# Patient Record
Sex: MALE | Race: WHITE | Employment: OTHER | URBAN - METROPOLITAN AREA
[De-identification: names, ages, dates, MRNs, and addresses within clinical notes are randomized per-mention and may not be internally consistent; named-entity substitution may affect disease eponyms.]

---

## 2023-05-09 ENCOUNTER — HOSPITAL ENCOUNTER (OUTPATIENT)
Dept: VASCULAR LAB | Age: 63
Discharge: HOME | End: 2023-05-09
Payer: MEDICARE

## 2023-05-09 PROCEDURE — 93971 EXTREMITY STUDY: CPT

## 2024-12-12 ENCOUNTER — APPOINTMENT (OUTPATIENT)
Dept: GENERAL RADIOLOGY | Age: 64
DRG: 915 | End: 2024-12-12
Payer: MEDICARE

## 2024-12-12 ENCOUNTER — HOSPITAL ENCOUNTER (EMERGENCY)
Age: 64
Discharge: HOME | DRG: 915 | End: 2024-12-12
Attending: EMERGENCY MEDICINE
Payer: MEDICARE

## 2024-12-12 ENCOUNTER — APPOINTMENT (OUTPATIENT)
Dept: CT IMAGING | Age: 64
DRG: 915 | End: 2024-12-12
Payer: MEDICARE

## 2024-12-12 ENCOUNTER — APPOINTMENT (OUTPATIENT)
Age: 64
DRG: 915 | End: 2024-12-12
Attending: INTERNAL MEDICINE
Payer: MEDICARE

## 2024-12-12 ENCOUNTER — HOSPITAL ENCOUNTER (INPATIENT)
Age: 64
LOS: 3 days | Discharge: HOSPICE | DRG: 915 | End: 2024-12-15
Attending: EMERGENCY MEDICINE | Admitting: STUDENT IN AN ORGANIZED HEALTH CARE EDUCATION/TRAINING PROGRAM
Payer: MEDICARE

## 2024-12-12 VITALS
HEART RATE: 108 BPM | WEIGHT: 243 LBS | TEMPERATURE: 97.4 F | SYSTOLIC BLOOD PRESSURE: 135 MMHG | DIASTOLIC BLOOD PRESSURE: 84 MMHG | RESPIRATION RATE: 25 BRPM | OXYGEN SATURATION: 100 %

## 2024-12-12 DIAGNOSIS — I46.9 CARDIAC ARREST: Primary | ICD-10-CM

## 2024-12-12 DIAGNOSIS — T78.2XXA ANAPHYLAXIS, INITIAL ENCOUNTER: ICD-10-CM

## 2024-12-12 LAB
ACANTHOCYTES BLD QL SMEAR: ABNORMAL
ALBUMIN SERPL-MCNC: 3 G/DL (ref 3.4–5)
ALBUMIN SERPL-MCNC: 3.3 G/DL (ref 3.4–5)
ALBUMIN/GLOB SERPL: 1.3 {RATIO} (ref 1.1–2.2)
ALP SERPL-CCNC: 100 U/L (ref 40–129)
ALT SERPL-CCNC: 241 U/L (ref 10–40)
ANION GAP SERPL CALCULATED.3IONS-SCNC: 15 MMOL/L (ref 3–16)
ANION GAP SERPL CALCULATED.3IONS-SCNC: 17 MMOL/L (ref 3–16)
ANISOCYTOSIS BLD QL SMEAR: ABNORMAL
ANTI-XA UNFRAC HEPARIN: >1.1 IU/ML (ref 0.3–0.7)
ANTI-XA UNFRAC HEPARIN: >1.1 IU/ML (ref 0.3–0.7)
APTT BLD: 30 SEC (ref 22.1–36.4)
APTT BLD: 33.2 SEC (ref 22.1–36.4)
AST SERPL-CCNC: 169 U/L (ref 15–37)
BASE EXCESS BLDA CALC-SCNC: -4 MMOL/L (ref -3–3)
BASE EXCESS BLDA CALC-SCNC: -7 MMOL/L (ref -3–3)
BASE EXCESS BLDV CALC-SCNC: -18.7 MMOL/L (ref -3–3)
BASE EXCESS BLDV CALC-SCNC: -9.8 MMOL/L (ref -3–3)
BASOPHILS # BLD: 0 K/UL (ref 0–0.2)
BASOPHILS NFR BLD: 0 %
BILIRUB SERPL-MCNC: <0.2 MG/DL (ref 0–1)
BUN SERPL-MCNC: 24 MG/DL (ref 7–20)
BUN SERPL-MCNC: 24 MG/DL (ref 7–20)
CALCIUM SERPL-MCNC: 8.6 MG/DL (ref 8.3–10.6)
CALCIUM SERPL-MCNC: 8.7 MG/DL (ref 8.3–10.6)
CHLORIDE SERPL-SCNC: 105 MMOL/L (ref 99–110)
CHLORIDE SERPL-SCNC: 106 MMOL/L (ref 99–110)
CO2 BLDA-SCNC: 21 MMOL/L
CO2 BLDA-SCNC: 24 MMOL/L
CO2 BLDV-SCNC: 20 MMOL/L
CO2 BLDV-SCNC: 21 MMOL/L
CO2 SERPL-SCNC: 18 MMOL/L (ref 21–32)
CO2 SERPL-SCNC: 20 MMOL/L (ref 21–32)
COHGB MFR BLDV: 2.3 % (ref 0–1.5)
COHGB MFR BLDV: 3.5 % (ref 0–1.5)
CREAT SERPL-MCNC: 1.3 MG/DL (ref 0.8–1.3)
CREAT SERPL-MCNC: 1.4 MG/DL (ref 0.8–1.3)
DACRYOCYTES BLD QL SMEAR: ABNORMAL
DEPRECATED RDW RBC AUTO: 21.2 % (ref 12.4–15.4)
ECHO AO ASC DIAM: 4 CM
ECHO AO ASCENDING AORTA INDEX: 1.85 CM/M2
ECHO AO ROOT DIAM: 3.6 CM
ECHO AO ROOT INDEX: 1.67 CM/M2
ECHO AV AREA PEAK VELOCITY: 4.1 CM2
ECHO AV AREA/BSA PEAK VELOCITY: 1.9 CM2/M2
ECHO AV CUSP MM: 1.7 CM
ECHO AV PEAK GRADIENT: 7 MMHG
ECHO AV PEAK GRADIENT: 7 MMHG
ECHO AV PEAK VELOCITY: 1.3 M/S
ECHO AV VELOCITY RATIO: 0.92
ECHO BSA: 2.24 M2
ECHO EST RA PRESSURE: 8 MMHG
ECHO IVC PROX: 1.5 CM
ECHO LA AREA 2C: 18.7 CM2
ECHO LA AREA 4C: 15 CM2
ECHO LA DIAMETER INDEX: 1.2 CM/M2
ECHO LA DIAMETER: 2.6 CM
ECHO LA MAJOR AXIS: 5.8 CM
ECHO LA MINOR AXIS: 5.1 CM
ECHO LA TO AORTIC ROOT RATIO: 0.72
ECHO LA VOL BP: 42 ML (ref 18–58)
ECHO LA VOL MOD A2C: 53 ML (ref 18–58)
ECHO LA VOL MOD A4C: 31 ML (ref 18–58)
ECHO LA VOL/BSA BIPLANE: 19 ML/M2 (ref 16–34)
ECHO LA VOLUME INDEX MOD A2C: 25 ML/M2 (ref 16–34)
ECHO LA VOLUME INDEX MOD A4C: 14 ML/M2 (ref 16–34)
ECHO LV E' LATERAL VELOCITY: 5.33 CM/S
ECHO LV E' SEPTAL VELOCITY: 6.96 CM/S
ECHO LV EDV A2C: 83 ML
ECHO LV EDV A4C: 89 ML
ECHO LV EDV INDEX A4C: 41 ML/M2
ECHO LV EDV NDEX A2C: 38 ML/M2
ECHO LV EF PHYSICIAN: 53 %
ECHO LV EJECTION FRACTION A2C: 54 %
ECHO LV EJECTION FRACTION A4C: 60 %
ECHO LV ESV A2C: 38 ML
ECHO LV ESV A4C: 36 ML
ECHO LV ESV INDEX A2C: 18 ML/M2
ECHO LV ESV INDEX A4C: 17 ML/M2
ECHO LV FRACTIONAL SHORTENING: 34 % (ref 28–44)
ECHO LV INTERNAL DIMENSION DIASTOLE INDEX: 2.04 CM/M2
ECHO LV INTERNAL DIMENSION DIASTOLIC: 4.4 CM (ref 4.2–5.9)
ECHO LV INTERNAL DIMENSION SYSTOLIC INDEX: 1.34 CM/M2
ECHO LV INTERNAL DIMENSION SYSTOLIC: 2.9 CM
ECHO LV ISOVOLUMETRIC RELAXATION TIME (IVRT): 132 MS
ECHO LV IVSD: 1.2 CM (ref 0.6–1)
ECHO LV MASS 2D: 168.9 G (ref 88–224)
ECHO LV MASS INDEX 2D: 78.2 G/M2 (ref 49–115)
ECHO LV POSTERIOR WALL DIASTOLIC: 1 CM (ref 0.6–1)
ECHO LV RELATIVE WALL THICKNESS RATIO: 0.45
ECHO LVOT AREA: 4.5 CM2
ECHO LVOT DIAM: 2.4 CM
ECHO LVOT MEAN GRADIENT: 3 MMHG
ECHO LVOT PEAK GRADIENT: 6 MMHG
ECHO LVOT PEAK VELOCITY: 1.2 M/S
ECHO LVOT STROKE VOLUME INDEX: 45.2 ML/M2
ECHO LVOT SV: 97.7 ML
ECHO LVOT VTI: 21.6 CM
ECHO MV A VELOCITY: 1.01 M/S
ECHO MV E VELOCITY: 0.69 M/S
ECHO MV E/A RATIO: 0.68
ECHO MV E/E' LATERAL: 12.95
ECHO MV E/E' RATIO (AVERAGED): 11.43
ECHO MV E/E' SEPTAL: 9.91
ECHO RA AREA 4C: 18.6 CM2
ECHO RA END SYSTOLIC VOLUME APICAL 4 CHAMBER INDEX BSA: 24 ML/M2
ECHO RA VOLUME: 51 ML
ECHO RV BASAL DIMENSION: 5 CM
ECHO RV FRACTIONAL AREA CHANGE: 32 %
ECHO RV FREE WALL PEAK S': 13.3 CM/S
ECHO RV LONGITUDINAL DIMENSION: 6.3 CM
ECHO RV MID DIMENSION: 4.2 CM
ECHO RV TAPSE: 2 CM (ref 1.7–?)
EKG ATRIAL RATE: 144 BPM
EKG DIAGNOSIS: NORMAL
EKG P AXIS: 102 DEGREES
EKG P-R INTERVAL: 158 MS
EKG Q-T INTERVAL: 356 MS
EKG QRS DURATION: 146 MS
EKG QTC CALCULATION (BAZETT): 537 MS
EKG R AXIS: 84 DEGREES
EKG T AXIS: -1 DEGREES
EKG VENTRICULAR RATE: 137 BPM
EOSINOPHIL # BLD: 0.1 K/UL (ref 0–0.6)
EOSINOPHIL NFR BLD: 1 %
GFR SERPLBLD CREATININE-BSD FMLA CKD-EPI: 55 ML/MIN/{1.73_M2}
GFR SERPLBLD CREATININE-BSD FMLA CKD-EPI: 61 ML/MIN/{1.73_M2}
GLUCOSE BLD-MCNC: 165 MG/DL (ref 70–99)
GLUCOSE BLD-MCNC: 218 MG/DL (ref 70–99)
GLUCOSE SERPL-MCNC: 153 MG/DL (ref 70–99)
GLUCOSE SERPL-MCNC: 284 MG/DL (ref 70–99)
HCO3 BLDA-SCNC: 19.7 MMOL/L (ref 21–29)
HCO3 BLDA-SCNC: 22.3 MMOL/L (ref 21–29)
HCO3 BLDV-SCNC: 17.2 MMOL/L (ref 23–29)
HCO3 BLDV-SCNC: 18.6 MMOL/L (ref 23–29)
HCT VFR BLD AUTO: 35.7 % (ref 40.5–52.5)
HGB BLD-MCNC: 10.9 G/DL (ref 13.5–17.5)
INR PPP: 1.3 (ref 0.85–1.15)
LACTATE BLD-SCNC: 2.48 MMOL/L (ref 0.4–2)
LACTATE BLD-SCNC: 3.92 MMOL/L (ref 0.4–2)
LYMPHOCYTES # BLD: 2 K/UL (ref 1–5.1)
LYMPHOCYTES NFR BLD: 32 %
MACROCYTES BLD QL SMEAR: ABNORMAL
MAGNESIUM SERPL-MCNC: 1.85 MG/DL (ref 1.8–2.4)
MCH RBC QN AUTO: 33.4 PG (ref 26–34)
MCHC RBC AUTO-ENTMCNC: 30.5 G/DL (ref 31–36)
MCV RBC AUTO: 109.6 FL (ref 80–100)
METAMYELOCYTES NFR BLD MANUAL: 3 %
METHGB MFR BLDV: 0.1 %
METHGB MFR BLDV: 0.3 %
MICROCYTES BLD QL SMEAR: ABNORMAL
MONOCYTES # BLD: 1 K/UL (ref 0–1.3)
MONOCYTES NFR BLD: 16 %
NEUTROPHILS # BLD: 3.1 K/UL (ref 1.7–7.7)
NEUTROPHILS NFR BLD: 46 %
NEUTS BAND NFR BLD MANUAL: 2 % (ref 0–7)
NRBC BLD-RTO: 9 /100 WBC
O2 THERAPY: ABNORMAL
O2 THERAPY: ABNORMAL
OVALOCYTES BLD QL SMEAR: ABNORMAL
PATH INTERP BLD-IMP: YES
PCO2 BLDA: 38.7 MM HG (ref 35–45)
PCO2 BLDA: 42 MM HG (ref 35–45)
PCO2 BLDV: 113.5 MMHG (ref 40–50)
PCO2 BLDV: 51.8 MMHG (ref 40–50)
PERFORMED ON: ABNORMAL
PERFORMED ON: ABNORMAL
PH BLDA: 7.31 [PH] (ref 7.35–7.45)
PH BLDA: 7.33 [PH] (ref 7.35–7.45)
PH BLDV: 6.8 [PH] (ref 7.35–7.45)
PH BLDV: 7.17 [PH] (ref 7.35–7.45)
PHOSPHATE SERPL-MCNC: 3.5 MG/DL (ref 2.5–4.9)
PLATELET # BLD AUTO: 215 K/UL (ref 135–450)
PLATELET BLD QL SMEAR: ADEQUATE
PMV BLD AUTO: 10.1 FL (ref 5–10.5)
PO2 BLDA: 105.1 MM HG (ref 75–108)
PO2 BLDA: 123.5 MM HG (ref 75–108)
PO2 BLDV: 106.3 MMHG (ref 25–40)
PO2 BLDV: 58.9 MMHG (ref 25–40)
POC SAMPLE TYPE: ABNORMAL
POC SAMPLE TYPE: ABNORMAL
POIKILOCYTOSIS BLD QL SMEAR: ABNORMAL
POTASSIUM SERPL-SCNC: 4.2 MMOL/L (ref 3.5–5.1)
POTASSIUM SERPL-SCNC: 4.4 MMOL/L (ref 3.5–5.1)
PROT SERPL-MCNC: 5.4 G/DL (ref 6.4–8.2)
PROTHROMBIN TIME: 16.4 SEC (ref 11.9–14.9)
RBC # BLD AUTO: 3.26 M/UL (ref 4.2–5.9)
REASON FOR REJECTION: NORMAL
REJECTED TEST: NORMAL
SAO2 % BLDA: 98 % (ref 93–100)
SAO2 % BLDA: 99 % (ref 93–100)
SAO2 % BLDV: 62 %
SAO2 % BLDV: 96 %
SCHISTOCYTES BLD QL SMEAR: ABNORMAL
SLIDE REVIEW: ABNORMAL
SODIUM SERPL-SCNC: 140 MMOL/L (ref 136–145)
SODIUM SERPL-SCNC: 141 MMOL/L (ref 136–145)
TROPONIN, HIGH SENSITIVITY: 122 NG/L (ref 0–22)
WBC # BLD AUTO: 6.1 K/UL (ref 4–11)

## 2024-12-12 PROCEDURE — 85730 THROMBOPLASTIN TIME PARTIAL: CPT

## 2024-12-12 PROCEDURE — 84484 ASSAY OF TROPONIN QUANT: CPT

## 2024-12-12 PROCEDURE — 36415 COLL VENOUS BLD VENIPUNCTURE: CPT

## 2024-12-12 PROCEDURE — 6360000002 HC RX W HCPCS: Performed by: EMERGENCY MEDICINE

## 2024-12-12 PROCEDURE — 99285 EMERGENCY DEPT VISIT HI MDM: CPT

## 2024-12-12 PROCEDURE — 83605 ASSAY OF LACTIC ACID: CPT

## 2024-12-12 PROCEDURE — 99291 CRITICAL CARE FIRST HOUR: CPT | Performed by: STUDENT IN AN ORGANIZED HEALTH CARE EDUCATION/TRAINING PROGRAM

## 2024-12-12 PROCEDURE — 5A12012 PERFORMANCE OF CARDIAC OUTPUT, SINGLE, MANUAL: ICD-10-PCS | Performed by: EMERGENCY MEDICINE

## 2024-12-12 PROCEDURE — 71260 CT THORAX DX C+: CPT

## 2024-12-12 PROCEDURE — 85610 PROTHROMBIN TIME: CPT

## 2024-12-12 PROCEDURE — 96374 THER/PROPH/DIAG INJ IV PUSH: CPT

## 2024-12-12 PROCEDURE — 2500000003 HC RX 250 WO HCPCS: Performed by: EMERGENCY MEDICINE

## 2024-12-12 PROCEDURE — 94761 N-INVAS EAR/PLS OXIMETRY MLT: CPT

## 2024-12-12 PROCEDURE — C8929 TTE W OR WO FOL WCON,DOPPLER: HCPCS

## 2024-12-12 PROCEDURE — 71045 X-RAY EXAM CHEST 1 VIEW: CPT

## 2024-12-12 PROCEDURE — 80053 COMPREHEN METABOLIC PANEL: CPT

## 2024-12-12 PROCEDURE — 2580000003 HC RX 258: Performed by: STUDENT IN AN ORGANIZED HEALTH CARE EDUCATION/TRAINING PROGRAM

## 2024-12-12 PROCEDURE — 2700000000 HC OXYGEN THERAPY PER DAY

## 2024-12-12 PROCEDURE — 85520 HEPARIN ASSAY: CPT

## 2024-12-12 PROCEDURE — 99291 CRITICAL CARE FIRST HOUR: CPT

## 2024-12-12 PROCEDURE — 6370000000 HC RX 637 (ALT 250 FOR IP): Performed by: STUDENT IN AN ORGANIZED HEALTH CARE EDUCATION/TRAINING PROGRAM

## 2024-12-12 PROCEDURE — 6360000004 HC RX CONTRAST MEDICATION: Performed by: EMERGENCY MEDICINE

## 2024-12-12 PROCEDURE — 94003 VENT MGMT INPAT SUBQ DAY: CPT

## 2024-12-12 PROCEDURE — 82947 ASSAY GLUCOSE BLOOD QUANT: CPT

## 2024-12-12 PROCEDURE — 74018 RADEX ABDOMEN 1 VIEW: CPT

## 2024-12-12 PROCEDURE — 5A1945Z RESPIRATORY VENTILATION, 24-96 CONSECUTIVE HOURS: ICD-10-PCS | Performed by: STUDENT IN AN ORGANIZED HEALTH CARE EDUCATION/TRAINING PROGRAM

## 2024-12-12 PROCEDURE — 31500 INSERT EMERGENCY AIRWAY: CPT

## 2024-12-12 PROCEDURE — 70450 CT HEAD/BRAIN W/O DYE: CPT

## 2024-12-12 PROCEDURE — 93005 ELECTROCARDIOGRAM TRACING: CPT | Performed by: INTERNAL MEDICINE

## 2024-12-12 PROCEDURE — 2000000000 HC ICU R&B

## 2024-12-12 PROCEDURE — 93010 ELECTROCARDIOGRAM REPORT: CPT | Performed by: INTERNAL MEDICINE

## 2024-12-12 PROCEDURE — 93005 ELECTROCARDIOGRAM TRACING: CPT | Performed by: EMERGENCY MEDICINE

## 2024-12-12 PROCEDURE — 6360000002 HC RX W HCPCS: Performed by: STUDENT IN AN ORGANIZED HEALTH CARE EDUCATION/TRAINING PROGRAM

## 2024-12-12 PROCEDURE — 0BH17EZ INSERTION OF ENDOTRACHEAL AIRWAY INTO TRACHEA, VIA NATURAL OR ARTIFICIAL OPENING: ICD-10-PCS | Performed by: EMERGENCY MEDICINE

## 2024-12-12 PROCEDURE — 85025 COMPLETE CBC W/AUTO DIFF WBC: CPT

## 2024-12-12 PROCEDURE — 99284 EMERGENCY DEPT VISIT MOD MDM: CPT

## 2024-12-12 PROCEDURE — 96365 THER/PROPH/DIAG IV INF INIT: CPT

## 2024-12-12 PROCEDURE — 99291 CRITICAL CARE FIRST HOUR: CPT | Performed by: INTERNAL MEDICINE

## 2024-12-12 PROCEDURE — 82803 BLOOD GASES ANY COMBINATION: CPT

## 2024-12-12 PROCEDURE — 6360000004 HC RX CONTRAST MEDICATION: Performed by: INTERNAL MEDICINE

## 2024-12-12 PROCEDURE — 83735 ASSAY OF MAGNESIUM: CPT

## 2024-12-12 PROCEDURE — 87040 BLOOD CULTURE FOR BACTERIA: CPT

## 2024-12-12 PROCEDURE — 96375 TX/PRO/DX INJ NEW DRUG ADDON: CPT

## 2024-12-12 PROCEDURE — 93306 TTE W/DOPPLER COMPLETE: CPT | Performed by: INTERNAL MEDICINE

## 2024-12-12 RX ORDER — HEPARIN SODIUM 10000 [USP'U]/100ML
5-30 INJECTION, SOLUTION INTRAVENOUS CONTINUOUS
Status: DISCONTINUED | OUTPATIENT
Start: 2024-12-12 | End: 2024-12-13

## 2024-12-12 RX ORDER — HEPARIN SODIUM 1000 [USP'U]/ML
40 INJECTION, SOLUTION INTRAVENOUS; SUBCUTANEOUS PRN
Status: DISCONTINUED | OUTPATIENT
Start: 2024-12-12 | End: 2024-12-12 | Stop reason: DRUGHIGH

## 2024-12-12 RX ORDER — ONDANSETRON 2 MG/ML
4 INJECTION INTRAMUSCULAR; INTRAVENOUS EVERY 6 HOURS PRN
Status: DISCONTINUED | OUTPATIENT
Start: 2024-12-12 | End: 2024-12-13

## 2024-12-12 RX ORDER — ENOXAPARIN SODIUM 100 MG/ML
30 INJECTION SUBCUTANEOUS 2 TIMES DAILY
Status: DISCONTINUED | OUTPATIENT
Start: 2024-12-12 | End: 2024-12-12

## 2024-12-12 RX ORDER — CALCIUM CHLORIDE 100 MG/ML
INJECTION INTRAVENOUS; INTRAVENTRICULAR DAILY PRN
Status: COMPLETED | OUTPATIENT
Start: 2024-12-12 | End: 2024-12-12

## 2024-12-12 RX ORDER — FUROSEMIDE 20 MG/1
20 TABLET ORAL DAILY
Status: ON HOLD | COMMUNITY
End: 2024-12-18 | Stop reason: HOSPADM

## 2024-12-12 RX ORDER — ONDANSETRON 4 MG/1
4 TABLET, ORALLY DISINTEGRATING ORAL EVERY 8 HOURS PRN
Status: DISCONTINUED | OUTPATIENT
Start: 2024-12-12 | End: 2024-12-13

## 2024-12-12 RX ORDER — TAMSULOSIN HYDROCHLORIDE 0.4 MG/1
0.4 CAPSULE ORAL DAILY
Status: ON HOLD | COMMUNITY
End: 2024-12-18 | Stop reason: HOSPADM

## 2024-12-12 RX ORDER — IOPAMIDOL 755 MG/ML
75 INJECTION, SOLUTION INTRAVASCULAR
Status: COMPLETED | OUTPATIENT
Start: 2024-12-12 | End: 2024-12-12

## 2024-12-12 RX ORDER — HEPARIN SODIUM 1000 [USP'U]/ML
80 INJECTION, SOLUTION INTRAVENOUS; SUBCUTANEOUS ONCE
Status: DISCONTINUED | OUTPATIENT
Start: 2024-12-12 | End: 2024-12-12 | Stop reason: DRUGHIGH

## 2024-12-12 RX ORDER — FENTANYL CITRATE-0.9 % NACL/PF 10 MCG/ML
25-200 PLASTIC BAG, INJECTION (ML) INTRAVENOUS CONTINUOUS
Status: DISCONTINUED | OUTPATIENT
Start: 2024-12-12 | End: 2024-12-12 | Stop reason: HOSPADM

## 2024-12-12 RX ORDER — SODIUM CHLORIDE 0.9 % (FLUSH) 0.9 %
5-40 SYRINGE (ML) INJECTION PRN
Status: DISCONTINUED | OUTPATIENT
Start: 2024-12-12 | End: 2024-12-15 | Stop reason: HOSPADM

## 2024-12-12 RX ORDER — SODIUM CHLORIDE 9 MG/ML
INJECTION, SOLUTION INTRAVENOUS PRN
Status: DISCONTINUED | OUTPATIENT
Start: 2024-12-12 | End: 2024-12-15 | Stop reason: HOSPADM

## 2024-12-12 RX ORDER — HEPARIN SODIUM 1000 [USP'U]/ML
4000 INJECTION, SOLUTION INTRAVENOUS; SUBCUTANEOUS PRN
Status: DISCONTINUED | OUTPATIENT
Start: 2024-12-12 | End: 2024-12-13

## 2024-12-12 RX ORDER — FENTANYL CITRATE-0.9 % NACL/PF 20 MCG/2ML
50 SYRINGE (ML) INTRAVENOUS EVERY 30 MIN PRN
Status: DISCONTINUED | OUTPATIENT
Start: 2024-12-12 | End: 2024-12-12 | Stop reason: HOSPADM

## 2024-12-12 RX ORDER — FENTANYL CITRATE-0.9 % NACL/PF 10 MCG/ML
25-200 PLASTIC BAG, INJECTION (ML) INTRAVENOUS CONTINUOUS
Status: DISCONTINUED | OUTPATIENT
Start: 2024-12-12 | End: 2024-12-13

## 2024-12-12 RX ORDER — HEPARIN SODIUM 1000 [USP'U]/ML
80 INJECTION, SOLUTION INTRAVENOUS; SUBCUTANEOUS PRN
Status: DISCONTINUED | OUTPATIENT
Start: 2024-12-12 | End: 2024-12-12 | Stop reason: DRUGHIGH

## 2024-12-12 RX ORDER — NOREPINEPHRINE BITARTRATE 0.06 MG/ML
1-100 INJECTION, SOLUTION INTRAVENOUS CONTINUOUS
Status: DISCONTINUED | OUTPATIENT
Start: 2024-12-12 | End: 2024-12-13

## 2024-12-12 RX ORDER — NOREPINEPHRINE BITARTRATE 0.06 MG/ML
1-100 INJECTION, SOLUTION INTRAVENOUS CONTINUOUS
Status: DISCONTINUED | OUTPATIENT
Start: 2024-12-12 | End: 2024-12-12 | Stop reason: HOSPADM

## 2024-12-12 RX ORDER — MIDAZOLAM HYDROCHLORIDE 1 MG/ML
2 INJECTION, SOLUTION INTRAMUSCULAR; INTRAVENOUS ONCE
Status: DISCONTINUED | OUTPATIENT
Start: 2024-12-12 | End: 2024-12-12

## 2024-12-12 RX ORDER — DEXTROSE MONOHYDRATE 100 MG/ML
INJECTION, SOLUTION INTRAVENOUS CONTINUOUS PRN
Status: DISCONTINUED | OUTPATIENT
Start: 2024-12-12 | End: 2024-12-15 | Stop reason: HOSPADM

## 2024-12-12 RX ORDER — ROSUVASTATIN CALCIUM 20 MG/1
20 TABLET, COATED ORAL NIGHTLY
Status: ON HOLD | COMMUNITY
End: 2024-12-18 | Stop reason: HOSPADM

## 2024-12-12 RX ORDER — PROPOFOL 10 MG/ML
5-50 INJECTION, EMULSION INTRAVENOUS CONTINUOUS
Status: DISCONTINUED | OUTPATIENT
Start: 2024-12-12 | End: 2024-12-13

## 2024-12-12 RX ORDER — MIRTAZAPINE 15 MG/1
15 TABLET, FILM COATED ORAL NIGHTLY
Status: ON HOLD | COMMUNITY
End: 2024-12-18 | Stop reason: HOSPADM

## 2024-12-12 RX ORDER — FENTANYL CITRATE-0.9 % NACL/PF 20 MCG/2ML
50 SYRINGE (ML) INTRAVENOUS EVERY 30 MIN PRN
Status: DISCONTINUED | OUTPATIENT
Start: 2024-12-12 | End: 2024-12-13

## 2024-12-12 RX ORDER — ACETAMINOPHEN 650 MG/1
650 SUPPOSITORY RECTAL EVERY 6 HOURS PRN
Status: DISCONTINUED | OUTPATIENT
Start: 2024-12-12 | End: 2024-12-15 | Stop reason: HOSPADM

## 2024-12-12 RX ORDER — MIDAZOLAM HYDROCHLORIDE 1 MG/ML
INJECTION, SOLUTION INTRAMUSCULAR; INTRAVENOUS DAILY PRN
Status: COMPLETED | OUTPATIENT
Start: 2024-12-12 | End: 2024-12-12

## 2024-12-12 RX ORDER — CHLORHEXIDINE GLUCONATE ORAL RINSE 1.2 MG/ML
15 SOLUTION DENTAL 2 TIMES DAILY
Status: DISCONTINUED | OUTPATIENT
Start: 2024-12-12 | End: 2024-12-15 | Stop reason: HOSPADM

## 2024-12-12 RX ORDER — ENOXAPARIN SODIUM 100 MG/ML
80 INJECTION SUBCUTANEOUS 2 TIMES DAILY
Status: ON HOLD | COMMUNITY
End: 2024-12-18 | Stop reason: HOSPADM

## 2024-12-12 RX ORDER — PROPOFOL 10 MG/ML
5-50 INJECTION, EMULSION INTRAVENOUS CONTINUOUS
Status: DISCONTINUED | OUTPATIENT
Start: 2024-12-12 | End: 2024-12-12 | Stop reason: HOSPADM

## 2024-12-12 RX ORDER — PANTOPRAZOLE SODIUM 40 MG/10ML
40 INJECTION, POWDER, LYOPHILIZED, FOR SOLUTION INTRAVENOUS DAILY
Status: DISCONTINUED | OUTPATIENT
Start: 2024-12-12 | End: 2024-12-15 | Stop reason: HOSPADM

## 2024-12-12 RX ORDER — SODIUM CHLORIDE 0.9 % (FLUSH) 0.9 %
5-40 SYRINGE (ML) INJECTION EVERY 12 HOURS SCHEDULED
Status: DISCONTINUED | OUTPATIENT
Start: 2024-12-12 | End: 2024-12-15 | Stop reason: HOSPADM

## 2024-12-12 RX ORDER — INSULIN LISPRO 100 [IU]/ML
0-4 INJECTION, SOLUTION INTRAVENOUS; SUBCUTANEOUS
Status: DISCONTINUED | OUTPATIENT
Start: 2024-12-12 | End: 2024-12-15 | Stop reason: HOSPADM

## 2024-12-12 RX ORDER — POLYETHYLENE GLYCOL 3350 17 G/17G
17 POWDER, FOR SOLUTION ORAL DAILY PRN
Status: DISCONTINUED | OUTPATIENT
Start: 2024-12-12 | End: 2024-12-15 | Stop reason: HOSPADM

## 2024-12-12 RX ORDER — EPINEPHRINE IN SOD CHLOR,ISO 1 MG/10 ML
SYRINGE (ML) INTRAVENOUS DAILY PRN
Status: COMPLETED | OUTPATIENT
Start: 2024-12-12 | End: 2024-12-12

## 2024-12-12 RX ORDER — HEPARIN SODIUM 1000 [USP'U]/ML
2000 INJECTION, SOLUTION INTRAVENOUS; SUBCUTANEOUS PRN
Status: DISCONTINUED | OUTPATIENT
Start: 2024-12-12 | End: 2024-12-13

## 2024-12-12 RX ORDER — ACETAMINOPHEN 325 MG/1
650 TABLET ORAL EVERY 6 HOURS PRN
Status: DISCONTINUED | OUTPATIENT
Start: 2024-12-12 | End: 2024-12-15 | Stop reason: HOSPADM

## 2024-12-12 RX ORDER — GLUCAGON 1 MG/ML
1 KIT INJECTION PRN
Status: DISCONTINUED | OUTPATIENT
Start: 2024-12-12 | End: 2024-12-15 | Stop reason: HOSPADM

## 2024-12-12 RX ORDER — POTASSIUM CHLORIDE 750 MG/1
10 CAPSULE, EXTENDED RELEASE ORAL 2 TIMES DAILY
Status: ON HOLD | COMMUNITY
End: 2024-12-18 | Stop reason: HOSPADM

## 2024-12-12 RX ORDER — M-VIT,TX,IRON,MINS/CALC/FOLIC 27MG-0.4MG
1 TABLET ORAL DAILY
Status: ON HOLD | COMMUNITY
End: 2024-12-18 | Stop reason: HOSPADM

## 2024-12-12 RX ORDER — DEXAMETHASONE 4 MG/1
4 TABLET ORAL 2 TIMES DAILY WITH MEALS
Status: ON HOLD | COMMUNITY
End: 2024-12-18 | Stop reason: HOSPADM

## 2024-12-12 RX ADMIN — HEPARIN SODIUM 9 UNITS/KG/HR: 10000 INJECTION, SOLUTION INTRAVENOUS at 20:27

## 2024-12-12 RX ADMIN — SODIUM BICARBONATE 50 MEQ: 84 INJECTION, SOLUTION INTRAVENOUS at 12:58

## 2024-12-12 RX ADMIN — SODIUM CHLORIDE: 9 INJECTION, SOLUTION INTRAVENOUS at 16:30

## 2024-12-12 RX ADMIN — Medication 50 MCG/HR: at 14:15

## 2024-12-12 RX ADMIN — PROPOFOL 20 MCG/KG/MIN: 10 INJECTION, EMULSION INTRAVENOUS at 13:17

## 2024-12-12 RX ADMIN — PROPOFOL 20 MCG/KG/MIN: 10 INJECTION, EMULSION INTRAVENOUS at 18:16

## 2024-12-12 RX ADMIN — SULFUR HEXAFLUORIDE 2 ML: KIT at 14:49

## 2024-12-12 RX ADMIN — NOREPINEPHRINE BITARTRATE 5 MCG/MIN: 64 SOLUTION INTRAVENOUS at 13:05

## 2024-12-12 RX ADMIN — EPINEPHRINE 1 MG: 0.1 INJECTION, SOLUTION ENDOTRACHEAL; INTRACARDIAC; INTRAVENOUS at 12:58

## 2024-12-12 RX ADMIN — CHLORHEXIDINE GLUCONATE 15 ML: 1.2 RINSE ORAL at 20:20

## 2024-12-12 RX ADMIN — PANTOPRAZOLE SODIUM 40 MG: 40 INJECTION, POWDER, FOR SOLUTION INTRAVENOUS at 20:20

## 2024-12-12 RX ADMIN — EPINEPHRINE 1 MG: 0.1 INJECTION, SOLUTION ENDOTRACHEAL; INTRACARDIAC; INTRAVENOUS at 12:51

## 2024-12-12 RX ADMIN — EPINEPHRINE 1 MG: 0.1 INJECTION, SOLUTION ENDOTRACHEAL; INTRACARDIAC; INTRAVENOUS at 12:54

## 2024-12-12 RX ADMIN — AMPICILLIN SODIUM AND SULBACTAM SODIUM 3000 MG: 2; 1 INJECTION, POWDER, FOR SOLUTION INTRAMUSCULAR; INTRAVENOUS at 16:31

## 2024-12-12 RX ADMIN — MIDAZOLAM 2 MG: 1 INJECTION INTRAMUSCULAR; INTRAVENOUS at 13:05

## 2024-12-12 RX ADMIN — IOPAMIDOL 75 ML: 755 INJECTION, SOLUTION INTRAVENOUS at 13:44

## 2024-12-12 RX ADMIN — Medication 10 ML: at 20:21

## 2024-12-12 RX ADMIN — AMPICILLIN SODIUM AND SULBACTAM SODIUM 3000 MG: 2; 1 INJECTION, POWDER, FOR SOLUTION INTRAMUSCULAR; INTRAVENOUS at 21:51

## 2024-12-12 RX ADMIN — INSULIN LISPRO 1 UNITS: 100 INJECTION, SOLUTION INTRAVENOUS; SUBCUTANEOUS at 16:23

## 2024-12-12 RX ADMIN — Medication 50 MCG/HR: at 13:54

## 2024-12-12 RX ADMIN — CALCIUM CHLORIDE 1000 MG: 100 INJECTION, SOLUTION INTRAVENOUS at 12:56

## 2024-12-12 ASSESSMENT — PULMONARY FUNCTION TESTS
PIF_VALUE: 18
PIF_VALUE: 14
PIF_VALUE: 13
PIF_VALUE: 12
PIF_VALUE: 17
PIF_VALUE: 10
PIF_VALUE: 16
PIF_VALUE: 9
PIF_VALUE: 18
PIF_VALUE: 24
PIF_VALUE: 11
PIF_VALUE: 14

## 2024-12-12 NOTE — ED PROVIDER NOTES
Emergency Department Provider Note  Location: [facility]  ED  12/12/2024     Patient Identification  Julio Covington is a 64 y.o. male    Chief Complaint  Cardiac arrest    Full ED provider note as written and additional chart marked for merge.  Please see separate chart.        Jonathan Green MD  12/12/24 0918

## 2024-12-12 NOTE — ED PROVIDER NOTES
dose to as low as reasonably achievable. COMPARISON: None. HISTORY: ORDERING SYSTEM PROVIDED HISTORY: ROSC TECHNOLOGIST PROVIDED HISTORY: Reason for exam:->ROSC Has a \"code stroke\" or \"stroke alert\" been called?->No Decision Support Exception - unselect if not a suspected or confirmed emergency medical condition->Emergency Medical Condition (MA) Reason for Exam: ROSC FINDINGS: Evaluation is limited by significant motion artifact. BRAIN/VENTRICLES: There is no acute intracranial hemorrhage, mass effect or midline shift.  No abnormal extra-axial fluid collection.  The gray-white differentiation is maintained without evidence of an acute infarct.  There is no evidence of hydrocephalus. There is severe volume loss.  Intracranial atherosclerosis is present. ORBITS: The visualized portion of the orbits demonstrate no acute abnormality. SINUSES: There is patchy opacification of the paranasal sinuses. SOFT TISSUES/SKULL:  No acute abnormality of the visualized skull or soft tissues.     No acute intracranial abnormality. Severe volume loss.     XR CHEST PORTABLE    Result Date: 12/12/2024  EXAMINATION: ONE XRAY VIEW OF THE CHEST 12/12/2024 1:03 pm COMPARISON: None. HISTORY: ORDERING SYSTEM PROVIDED HISTORY: ETT placement TECHNOLOGIST PROVIDED HISTORY: Reason for exam:->ETT placement FINDINGS: There is an ET tube in place, in the midtrachea.  There is a port in place with distal tip overlying the superior vena cava.  There is mild pulmonary vascular congestion and cardiomegaly, suggestive of mild CHF.  Bony structures appear normal.  Visualized upper abdomen appears normal.     1. ET tube in place, in the midtrachea. 2. Mild CHF.      Bedside Ultrasound  No results found.       Labs  Results for orders placed or performed during the hospital encounter of 12/12/24   CBC with Auto Differential   Result Value Ref Range    WBC 6.1 4.0 - 11.0 K/uL    RBC 3.26 (L) 4.20 - 5.90 M/uL    Hemoglobin 10.9 (L) 13.5 - 17.5 g/dL

## 2024-12-12 NOTE — H&P
Hospital Medicine History & Physical      Date of Admission: 12/12/2024    Date of Service:  Pt seen/examined on 12/12/2024    [x]Admitted to Inpatient with expected LOS greater than two midnights due to medical therapy.  []Placed in Observation status.    Chief Admission Complaint: Cardiac arrest    Presenting Admission History:      64 y.o. adult who presented to [facility] with cardiac arrest.  PMHx significant for stage IV SCC.      Patient was getting chemotherapy at office when he became unresponsive when EMS arrived patient was hypoxic and cyanotic he was in PEA arrest was given epinephrine and CPR and in ED when he arrived he received 4-5 rounds of ACLS eventually achieved ROSC.  There was concern potentially the patient had anaphylaxis given that he was developing rash but unclear.  In ED patient was found to have lactic acidosis CT PE was negative for PE slightly elevated ALT and AST.  Also had elevated creatinine however do not have a baseline for him.  Unfortunately not much information is available in chart and requires permission with documents to be shared.      Assessment/Plan:      Current Principal Problem:  Cardiac arrest    Cardiac arrest-unclear etiology at this point possible anaphylaxis to chemotherapy.  Patient currently off pressor and hemodynamically stable.  Status post ROSC after multiple rounds of ACLS protocol.  Acute Respiratory Failure - w/ hypoxia, POArrival.  Presence of clinical respiratory distress w/ tachypnea/dyspnea/SOB and wheezing w/ use of accessory muscles to breath.  On supplemental O2 as ordered and wean as tolerated.   PNA - likely Gram Positive Community Acquired Pneumonia, possibly due to Strep Pneumonia.  Started on empiric Unasyn on 12/12.       -Continue mechanical ventilatory support.  -Sedation as needed.  -Critical care consulted and appreciate their recommendations.  -Trend lactic acid continue IV Unasyn.  Volume resuscitation as needed.  -CBC and CMP

## 2024-12-12 NOTE — ED NOTES
Findings from KUB from merged chart:  EXAMINATION:  ONE SUPINE XRAY VIEW(S) OF THE ABDOMEN     12/12/2024 1:56 pm     COMPARISON:  None.     HISTORY:  ORDERING SYSTEM PROVIDED HISTORY: og placement  TECHNOLOGIST PROVIDED HISTORY:  Reason for exam:->og placement  Reason for Exam: og placement     FINDINGS:  There is a orogastric tube in place, with distal portion in the stomach.     IMPRESSION:  Orogastric tube in place, with distal portion in the stomach.

## 2024-12-12 NOTE — ED NOTES
Mr. Covington is a 64 y.o. male who had concerns including Cardiac Arrest (Pt at cancer treatment, pt had a reaction to a medication EMS gave benadryl pt arrested and 1 epi was given per ems ).          Chief Complaint   Patient presents with    Cardiac Arrest       Pt at cancer treatment, pt had a reaction to a medication EMS gave benadryl pt arrested and 1 epi was given per ems          He is being admitted for:     <principal problem not specified>     His ED problem list included:     No diagnosis found.     History reviewed. No pertinent past medical history.     History reviewed. No pertinent surgical history.     His recent abnormal labs were:     Labs Reviewed - No data to display     His vital signs for the encounter were:     Patient Vitals for the past 24 hrs:    BP Temp Temp src Pulse Resp SpO2 Weight   12/12/24 1356 135/84 97.4 °F (36.3 °C) CORE (!) 108 25 100 % --   12/12/24 1334 (!) 147/99 97.5 °F (36.4 °C) CORE (!) 110 20 100 % --   12/12/24 1319 (!) 156/87 -- -- 93 21 100 % --   12/12/24 1315 -- -- -- -- -- -- 110.2 kg (243 lb)         He has the following lines:          Implantable Port Chest (Active)       Peripheral IV 12/12/24 Left Antecubital (Active)   Site Assessment Clean, dry & intact 12/12/24 1334   Line Status Blood return noted 12/12/24 1334       NG/OG/NJ/NE Tube Orogastric Center mouth (Active)   NG/OG/NJ/NE External Measurement (cm) 65 cm 12/12/24 1318       Urinary Catheter 12/12/24 Oliveros-Temperature (Active)       Intraosseous Line 12/12/24 Right;Dorsal Tibia (Active)         He has received the following medications:     Medications   propofol infusion (20 mcg/kg/min × 110.2 kg IntraVENous New Bag 12/12/24 1317)   fentaNYL (SUBLIMAZE) 1,000 mcg in sodium chloride 0.9% 100 mL infusion (50 mcg/hr IntraVENous New Bag 12/12/24 1354)     And   fentaNYL (SUBLIMAZE) bolus from bag (has no administration in time range)   norepinephrine (LEVOPHED) 16 mg in sodium chloride 0.9 % 250 mL

## 2024-12-12 NOTE — CONSULTS
PULMONARY AND CRITICAL CARE INPATIENT CONSULTATION      12/12/2024    Patient Name:  Julio Covington       1960       Evaluation was requested by Dr. Larsen regarding cardiac arrest.    HPI:   Patient is a 64 y.o. adult with significant past medical history of hyperlipidemia, Stage IV SCC that presents to [facility] for unresponsiveness.  Patient was getting chemotherapy at doctor's office when he became unresponsive.  When EMS arrived patient was hypoxic and cyanotic.  He was in PEA arrest and he was given epinephrine and CPR.  When he arrived at [facility], ED he had around 4-5 rounds of ACLS.  He achieved ROSC.  He was transferred to ICU for further management.      Invasive Lines: PICC D#None   CVC D#None  Art Line D#None          ROS:   Review of Systems   Unable to perform ROS: Intubated          No past medical history on file.     No past surgical history on file.     No family history on file.     Social History     Tobacco Use    Smoking status: Not on file    Smokeless tobacco: Not on file   Substance Use Topics    Alcohol use: Not on file        Not on File      Vital Signs:  Blood pressure (!) 126/94, pulse 95, temperature 97.1 °F (36.2 °C), temperature source Core, resp. rate 29, height 1.702 m (5' 7\"), weight 106.3 kg (234 lb 6.4 oz), SpO2 100%.' on RA  Body mass index is 36.71 kg/m².  CVP:    No intake or output data in the 24 hours ending 12/12/24 1531      PHYSICAL EXAM:  Physical Exam  Constitutional:       Appearance: He is ill-appearing.      Comments: Intubated and sedated   HENT:      Head: Normocephalic and atraumatic.      Nose: Nose normal.      Mouth/Throat:      Pharynx: No oropharyngeal exudate.   Eyes:      General: No scleral icterus.        Right eye: No discharge.         Left eye: No discharge.   Cardiovascular:      Rate and Rhythm: Normal rate.      Heart sounds: No murmur heard.     No gallop.   Pulmonary:      Effort: Pulmonary effort is normal.      Breath

## 2024-12-12 NOTE — ED NOTES
Mr. Covington is a 64 y.o. male who had concerns including Cardiac Arrest (Pt at cancer treatment, pt had a reaction to a medication EMS gave benadryl pt arrested and 1 epi was given per ems ).    Chief Complaint   Patient presents with    Cardiac Arrest     Pt at cancer treatment, pt had a reaction to a medication EMS gave benadryl pt arrested and 1 epi was given per ems        He is being admitted for:    <principal problem not specified>    His ED problem list included:    No diagnosis found.    History reviewed. No pertinent past medical history.    History reviewed. No pertinent surgical history.    His recent abnormal labs were:    Labs Reviewed - No data to display    His vital signs for the encounter were:    Patient Vitals for the past 24 hrs:   BP Temp Temp src Pulse Resp SpO2 Weight   12/12/24 1356 135/84 97.4 °F (36.3 °C) CORE (!) 108 25 100 % --   12/12/24 1334 (!) 147/99 97.5 °F (36.4 °C) CORE (!) 110 20 100 % --   12/12/24 1319 (!) 156/87 -- -- 93 21 100 % --   12/12/24 1315 -- -- -- -- -- -- 110.2 kg (243 lb)        He has the following lines:    Implantable Port Chest (Active)       Peripheral IV 12/12/24 Left Antecubital (Active)   Site Assessment Clean, dry & intact 12/12/24 1334   Line Status Blood return noted 12/12/24 1334       NG/OG/NJ/NE Tube Orogastric Center mouth (Active)   NG/OG/NJ/NE External Measurement (cm) 65 cm 12/12/24 1318       Urinary Catheter 12/12/24 Oliveros-Temperature (Active)       Intraosseous Line 12/12/24 Right;Dorsal Tibia (Active)       He has received the following medications:    Medications   propofol infusion (20 mcg/kg/min × 110.2 kg IntraVENous New Bag 12/12/24 1317)   fentaNYL (SUBLIMAZE) 1,000 mcg in sodium chloride 0.9% 100 mL infusion (50 mcg/hr IntraVENous New Bag 12/12/24 1354)     And   fentaNYL (SUBLIMAZE) bolus from bag (has no administration in time range)   norepinephrine (LEVOPHED) 16 mg in sodium chloride 0.9 % 250 mL infusion (has no administration in

## 2024-12-13 PROBLEM — R79.89 ELEVATED TROPONIN: Status: ACTIVE | Noted: 2024-12-13

## 2024-12-13 PROBLEM — I45.10 RIGHT BUNDLE BRANCH BLOCK: Status: ACTIVE | Noted: 2024-12-13

## 2024-12-13 LAB
ALBUMIN SERPL-MCNC: 3.2 G/DL (ref 3.4–5)
ALBUMIN SERPL-MCNC: 3.2 G/DL (ref 3.4–5)
ALP SERPL-CCNC: 74 U/L (ref 40–129)
ALT SERPL-CCNC: 205 U/L (ref 10–40)
ANION GAP SERPL CALCULATED.3IONS-SCNC: 13 MMOL/L (ref 3–16)
ANION GAP SERPL CALCULATED.3IONS-SCNC: 15 MMOL/L (ref 3–16)
ANTI-XA UNFRAC HEPARIN: >1.1 IU/ML (ref 0.3–0.7)
APTT BLD: 62.6 SEC (ref 22.1–36.4)
AST SERPL-CCNC: 105 U/L (ref 15–37)
BASE EXCESS BLDA CALC-SCNC: -1.9 MMOL/L (ref -3–3)
BASOPHILS # BLD: 0 K/UL (ref 0–0.2)
BASOPHILS NFR BLD: 0.1 %
BILIRUB DIRECT SERPL-MCNC: <0.1 MG/DL (ref 0–0.3)
BILIRUB INDIRECT SERPL-MCNC: ABNORMAL MG/DL (ref 0–1)
BILIRUB SERPL-MCNC: <0.2 MG/DL (ref 0–1)
BUN SERPL-MCNC: 24 MG/DL (ref 7–20)
BUN SERPL-MCNC: 25 MG/DL (ref 7–20)
CALCIUM SERPL-MCNC: 8.3 MG/DL (ref 8.3–10.6)
CALCIUM SERPL-MCNC: 8.3 MG/DL (ref 8.3–10.6)
CHLORIDE SERPL-SCNC: 106 MMOL/L (ref 99–110)
CHLORIDE SERPL-SCNC: 106 MMOL/L (ref 99–110)
CO2 BLDA-SCNC: 24.4 MMOL/L
CO2 SERPL-SCNC: 19 MMOL/L (ref 21–32)
CO2 SERPL-SCNC: 21 MMOL/L (ref 21–32)
COHGB MFR BLDA: 0.3 % (ref 0–1.5)
CREAT SERPL-MCNC: 1.1 MG/DL (ref 0.8–1.3)
CREAT SERPL-MCNC: 1.2 MG/DL (ref 0.8–1.3)
DEPRECATED RDW RBC AUTO: 19.3 % (ref 12.4–15.4)
EKG ATRIAL RATE: 98 BPM
EKG DIAGNOSIS: NORMAL
EKG P AXIS: -22 DEGREES
EKG P-R INTERVAL: 146 MS
EKG Q-T INTERVAL: 366 MS
EKG QRS DURATION: 136 MS
EKG QTC CALCULATION (BAZETT): 467 MS
EKG R AXIS: 57 DEGREES
EKG T AXIS: 16 DEGREES
EKG VENTRICULAR RATE: 98 BPM
EOSINOPHIL # BLD: 0 K/UL (ref 0–0.6)
EOSINOPHIL NFR BLD: 0 %
GFR SERPLBLD CREATININE-BSD FMLA CKD-EPI: 67 ML/MIN/{1.73_M2}
GFR SERPLBLD CREATININE-BSD FMLA CKD-EPI: 74 ML/MIN/{1.73_M2}
GLUCOSE BLD-MCNC: 141 MG/DL (ref 70–99)
GLUCOSE BLD-MCNC: 145 MG/DL (ref 70–99)
GLUCOSE BLD-MCNC: 160 MG/DL (ref 70–99)
GLUCOSE BLD-MCNC: 162 MG/DL (ref 70–99)
GLUCOSE BLD-MCNC: 217 MG/DL (ref 70–99)
GLUCOSE SERPL-MCNC: 149 MG/DL (ref 70–99)
GLUCOSE SERPL-MCNC: 160 MG/DL (ref 70–99)
HCO3 BLDA-SCNC: 23.1 MMOL/L (ref 21–29)
HCT VFR BLD AUTO: 28.8 % (ref 40.5–52.5)
HGB BLD-MCNC: 9.3 G/DL (ref 13.5–17.5)
HGB BLDA-MCNC: 10.6 G/DL (ref 13.5–17.5)
LACTATE BLDV-SCNC: 2.3 MMOL/L (ref 0.4–2)
LACTATE BLDV-SCNC: 2.3 MMOL/L (ref 0.4–2)
LACTATE BLDV-SCNC: 2.4 MMOL/L (ref 0.4–2)
LYMPHOCYTES # BLD: 0.4 K/UL (ref 1–5.1)
LYMPHOCYTES NFR BLD: 4 %
MAGNESIUM SERPL-MCNC: 1.86 MG/DL (ref 1.8–2.4)
MCH RBC QN AUTO: 32.4 PG (ref 26–34)
MCHC RBC AUTO-ENTMCNC: 32.4 G/DL (ref 31–36)
MCV RBC AUTO: 100.1 FL (ref 80–100)
METHGB MFR BLDA: 0.6 %
MONOCYTES # BLD: 0.2 K/UL (ref 0–1.3)
MONOCYTES NFR BLD: 2 %
NEUTROPHILS # BLD: 8.8 K/UL (ref 1.7–7.7)
NEUTROPHILS NFR BLD: 93.9 %
O2 THERAPY: ABNORMAL
PCO2 BLDA: 40.3 MMHG (ref 35–45)
PERFORMED ON: ABNORMAL
PH BLDA: 7.38 [PH] (ref 7.35–7.45)
PHOSPHATE SERPL-MCNC: 3.6 MG/DL (ref 2.5–4.9)
PLATELET # BLD AUTO: 163 K/UL (ref 135–450)
PMV BLD AUTO: 8.8 FL (ref 5–10.5)
PO2 BLDA: 89.1 MMHG (ref 75–108)
POTASSIUM SERPL-SCNC: 4.3 MMOL/L (ref 3.5–5.1)
POTASSIUM SERPL-SCNC: 4.7 MMOL/L (ref 3.5–5.1)
PROT SERPL-MCNC: 5.7 G/DL (ref 6.4–8.2)
RBC # BLD AUTO: 2.88 M/UL (ref 4.2–5.9)
SAO2 % BLDA: 96 %
SODIUM SERPL-SCNC: 140 MMOL/L (ref 136–145)
SODIUM SERPL-SCNC: 140 MMOL/L (ref 136–145)
TROPONIN, HIGH SENSITIVITY: 98 NG/L (ref 0–22)
WBC # BLD AUTO: 9.3 K/UL (ref 4–11)

## 2024-12-13 PROCEDURE — 2580000003 HC RX 258: Performed by: STUDENT IN AN ORGANIZED HEALTH CARE EDUCATION/TRAINING PROGRAM

## 2024-12-13 PROCEDURE — 6360000002 HC RX W HCPCS: Performed by: PSYCHIATRY & NEUROLOGY

## 2024-12-13 PROCEDURE — 84484 ASSAY OF TROPONIN QUANT: CPT

## 2024-12-13 PROCEDURE — 94002 VENT MGMT INPAT INIT DAY: CPT

## 2024-12-13 PROCEDURE — 6370000000 HC RX 637 (ALT 250 FOR IP): Performed by: STUDENT IN AN ORGANIZED HEALTH CARE EDUCATION/TRAINING PROGRAM

## 2024-12-13 PROCEDURE — 83735 ASSAY OF MAGNESIUM: CPT

## 2024-12-13 PROCEDURE — 2500000003 HC RX 250 WO HCPCS: Performed by: EMERGENCY MEDICINE

## 2024-12-13 PROCEDURE — 95816 EEG AWAKE AND DROWSY: CPT | Performed by: PSYCHIATRY & NEUROLOGY

## 2024-12-13 PROCEDURE — 6360000002 HC RX W HCPCS: Performed by: EMERGENCY MEDICINE

## 2024-12-13 PROCEDURE — 95819 EEG AWAKE AND ASLEEP: CPT

## 2024-12-13 PROCEDURE — 85025 COMPLETE CBC W/AUTO DIFF WBC: CPT

## 2024-12-13 PROCEDURE — 2700000000 HC OXYGEN THERAPY PER DAY

## 2024-12-13 PROCEDURE — 85520 HEPARIN ASSAY: CPT

## 2024-12-13 PROCEDURE — 93010 ELECTROCARDIOGRAM REPORT: CPT | Performed by: INTERNAL MEDICINE

## 2024-12-13 PROCEDURE — 80076 HEPATIC FUNCTION PANEL: CPT

## 2024-12-13 PROCEDURE — 83605 ASSAY OF LACTIC ACID: CPT

## 2024-12-13 PROCEDURE — 99291 CRITICAL CARE FIRST HOUR: CPT | Performed by: INTERNAL MEDICINE

## 2024-12-13 PROCEDURE — 80069 RENAL FUNCTION PANEL: CPT

## 2024-12-13 PROCEDURE — 99291 CRITICAL CARE FIRST HOUR: CPT | Performed by: STUDENT IN AN ORGANIZED HEALTH CARE EDUCATION/TRAINING PROGRAM

## 2024-12-13 PROCEDURE — 85730 THROMBOPLASTIN TIME PARTIAL: CPT

## 2024-12-13 PROCEDURE — 94761 N-INVAS EAR/PLS OXIMETRY MLT: CPT

## 2024-12-13 PROCEDURE — 6360000002 HC RX W HCPCS: Performed by: NURSE PRACTITIONER

## 2024-12-13 PROCEDURE — 6360000002 HC RX W HCPCS: Performed by: STUDENT IN AN ORGANIZED HEALTH CARE EDUCATION/TRAINING PROGRAM

## 2024-12-13 PROCEDURE — 2000000000 HC ICU R&B

## 2024-12-13 PROCEDURE — 82803 BLOOD GASES ANY COMBINATION: CPT

## 2024-12-13 RX ORDER — FUROSEMIDE 10 MG/ML
20 INJECTION INTRAMUSCULAR; INTRAVENOUS ONCE
Status: COMPLETED | OUTPATIENT
Start: 2024-12-13 | End: 2024-12-13

## 2024-12-13 RX ORDER — SENNOSIDES A AND B 8.6 MG/1
1 TABLET, FILM COATED ORAL 2 TIMES DAILY
Status: DISCONTINUED | OUTPATIENT
Start: 2024-12-13 | End: 2024-12-15 | Stop reason: HOSPADM

## 2024-12-13 RX ORDER — PROCHLORPERAZINE EDISYLATE 5 MG/ML
10 INJECTION INTRAMUSCULAR; INTRAVENOUS EVERY 6 HOURS PRN
Status: DISCONTINUED | OUTPATIENT
Start: 2024-12-13 | End: 2024-12-15 | Stop reason: HOSPADM

## 2024-12-13 RX ORDER — MINERAL OIL AND WHITE PETROLATUM 150; 830 MG/G; MG/G
OINTMENT OPHTHALMIC
Status: DISCONTINUED | OUTPATIENT
Start: 2024-12-13 | End: 2024-12-15 | Stop reason: HOSPADM

## 2024-12-13 RX ORDER — SODIUM CHLORIDE, SODIUM LACTATE, POTASSIUM CHLORIDE, AND CALCIUM CHLORIDE .6; .31; .03; .02 G/100ML; G/100ML; G/100ML; G/100ML
500 INJECTION, SOLUTION INTRAVENOUS ONCE
Status: DISCONTINUED | OUTPATIENT
Start: 2024-12-13 | End: 2024-12-15 | Stop reason: HOSPADM

## 2024-12-13 RX ORDER — MUPIROCIN 20 MG/G
OINTMENT TOPICAL 2 TIMES DAILY
Status: DISCONTINUED | OUTPATIENT
Start: 2024-12-13 | End: 2024-12-15 | Stop reason: HOSPADM

## 2024-12-13 RX ORDER — ENOXAPARIN SODIUM 100 MG/ML
100 INJECTION SUBCUTANEOUS 2 TIMES DAILY
Status: DISCONTINUED | OUTPATIENT
Start: 2024-12-13 | End: 2024-12-15 | Stop reason: HOSPADM

## 2024-12-13 RX ORDER — LEVETIRACETAM 500 MG/5ML
1500 INJECTION, SOLUTION, CONCENTRATE INTRAVENOUS EVERY 12 HOURS
Status: DISCONTINUED | OUTPATIENT
Start: 2024-12-13 | End: 2024-12-15 | Stop reason: HOSPADM

## 2024-12-13 RX ADMIN — AMPICILLIN SODIUM AND SULBACTAM SODIUM 3000 MG: 2; 1 INJECTION, POWDER, FOR SOLUTION INTRAMUSCULAR; INTRAVENOUS at 03:54

## 2024-12-13 RX ADMIN — MINERAL OIL, PETROLATUM: 425; 568 OINTMENT OPHTHALMIC at 20:14

## 2024-12-13 RX ADMIN — MUPIROCIN: 20 OINTMENT TOPICAL at 20:14

## 2024-12-13 RX ADMIN — Medication 75 MCG/HR: at 03:43

## 2024-12-13 RX ADMIN — CHLORHEXIDINE GLUCONATE 15 ML: 1.2 RINSE ORAL at 08:17

## 2024-12-13 RX ADMIN — PANTOPRAZOLE SODIUM 40 MG: 40 INJECTION, POWDER, FOR SOLUTION INTRAVENOUS at 08:16

## 2024-12-13 RX ADMIN — PROPOFOL 25 MCG/KG/MIN: 10 INJECTION, EMULSION INTRAVENOUS at 05:15

## 2024-12-13 RX ADMIN — AMPICILLIN SODIUM AND SULBACTAM SODIUM 3000 MG: 2; 1 INJECTION, POWDER, FOR SOLUTION INTRAMUSCULAR; INTRAVENOUS at 22:06

## 2024-12-13 RX ADMIN — Medication 10 ML: at 10:05

## 2024-12-13 RX ADMIN — SENNOSIDES 8.6 MG: 8.6 TABLET, FILM COATED ORAL at 20:15

## 2024-12-13 RX ADMIN — ENOXAPARIN SODIUM 100 MG: 100 INJECTION SUBCUTANEOUS at 20:14

## 2024-12-13 RX ADMIN — ENOXAPARIN SODIUM 100 MG: 100 INJECTION SUBCUTANEOUS at 10:01

## 2024-12-13 RX ADMIN — MINERAL OIL, PETROLATUM: 425; 568 OINTMENT OPHTHALMIC at 16:34

## 2024-12-13 RX ADMIN — LEVETIRACETAM 1500 MG: 100 INJECTION INTRAVENOUS at 18:52

## 2024-12-13 RX ADMIN — DOCUSATE SODIUM LIQUID 100 MG: 100 LIQUID ORAL at 10:01

## 2024-12-13 RX ADMIN — DOCUSATE SODIUM LIQUID 100 MG: 100 LIQUID ORAL at 20:15

## 2024-12-13 RX ADMIN — MINERAL OIL, PETROLATUM: 425; 568 OINTMENT OPHTHALMIC at 12:00

## 2024-12-13 RX ADMIN — FUROSEMIDE 20 MG: 10 INJECTION, SOLUTION INTRAMUSCULAR; INTRAVENOUS at 14:52

## 2024-12-13 RX ADMIN — AMPICILLIN SODIUM AND SULBACTAM SODIUM 3000 MG: 2; 1 INJECTION, POWDER, FOR SOLUTION INTRAMUSCULAR; INTRAVENOUS at 10:04

## 2024-12-13 RX ADMIN — AMPICILLIN SODIUM AND SULBACTAM SODIUM 3000 MG: 2; 1 INJECTION, POWDER, FOR SOLUTION INTRAMUSCULAR; INTRAVENOUS at 16:31

## 2024-12-13 RX ADMIN — SENNOSIDES 8.6 MG: 8.6 TABLET, FILM COATED ORAL at 10:01

## 2024-12-13 RX ADMIN — CHLORHEXIDINE GLUCONATE 15 ML: 1.2 RINSE ORAL at 20:14

## 2024-12-13 RX ADMIN — Medication 10 ML: at 20:15

## 2024-12-13 RX ADMIN — PROPOFOL 25 MCG/KG/MIN: 10 INJECTION, EMULSION INTRAVENOUS at 00:55

## 2024-12-13 RX ADMIN — MUPIROCIN: 20 OINTMENT TOPICAL at 08:17

## 2024-12-13 RX ADMIN — HEPARIN SODIUM 2000 UNITS: 1000 INJECTION INTRAVENOUS; SUBCUTANEOUS at 04:59

## 2024-12-13 ASSESSMENT — PULMONARY FUNCTION TESTS
PIF_VALUE: 15
PIF_VALUE: 16
PIF_VALUE: 17
PIF_VALUE: 20
PIF_VALUE: 15
PIF_VALUE: 15
PIF_VALUE: 12
PIF_VALUE: 23
PIF_VALUE: 19
PIF_VALUE: 14
PIF_VALUE: 8
PIF_VALUE: 16
PIF_VALUE: 14
PIF_VALUE: 15
PIF_VALUE: 16
PIF_VALUE: 19
PIF_VALUE: 14
PIF_VALUE: 15
PIF_VALUE: 15
PIF_VALUE: 18
PIF_VALUE: 15
PIF_VALUE: 16
PIF_VALUE: 17
PIF_VALUE: 16
PIF_VALUE: 36
PIF_VALUE: 16
PIF_VALUE: 19
PIF_VALUE: 25
PIF_VALUE: 8
PIF_VALUE: 14

## 2024-12-13 NOTE — CONSULTS
12/12/24 at 19:37  - Anti-Xa= > 1.10  - aPTT = 30 sec  - Will hold off initial bolus , start infusion at 9 units/kg/hr .  - Will recheck Anti-Xa at 6 hrs ~ 2:30am.  Annette Jesus/Keith. 12/12/24 8:23 PM EST    Patient baseline anti-Xa >1.1; spoke with Dion GUNDERSON, he stated he called lab and asked for add-on of aPTT. Patient last fill rivaroxaban in May. Will use aPTT going forward. No adjustment at this time, will wait for aPTT result  Tung Gil PharmALEX 12/13/2024  4:22 AM    12/13 at 0345  aPTT: 62.6  Plan: Give 2000 units of heparin as bolus  Increase heparin infusion rate to 11 units/kg/hr  Recheck aPTT at 1100  Tung Gil PharmALEX 12/13/2024  4:59 AM

## 2024-12-13 NOTE — CONSULTS
Comprehensive Nutrition Assessment    Type and Reason for Visit:  Initial, Consult, Nutrition support    Nutrition Recommendations/Plan:   When feeding tube placement confirmed and ready to begin TF, recommend order: \"Diet: Tube feed continuous/ NPO\".  Initiate Vital AF (peptide based formula) at 10 mL/hr and as tolerated, increase by 10 mL/hr q 4 hours until goal of 60 mL/hr is met via O/G   Recommend 60 mL H20 flush q 4 hours. Monitor IVF infusion, Na labs and need for adjustments in water flush  Consider prophylactic prokinetic agent (such as reglan, erythromycin) to promote EN tolerance as appropriate   Consider daily micronutrient supplementation: 2000 IU Vitamin D3, MVM, + Probiotic   Monitor TF tolerance (abd distention, bowel habits, N/V, cramping), nutrition adequacy, pertinent labs, bowel habits, wt changes, and clinical progress     Malnutrition Assessment:  Malnutrition Status:  Insufficient data (12/13/24 1101)      Nutrition Assessment:    Consult for TF ordering and mangement. Pt with PMHx of HTN, CKD, stage IV SCC who presented with cardiac arrest. Currently intubated in the ICU. Pt off sedation/analgesia. Poor mental response per MD note. Plans for neuro assessments for possible anoxic brain injury. OG in place. Will start TF today per pulm MD note. Will monitor.    Nutrition Related Findings:    Intubated. Off sedation. No weight Hx in EMR to review. Hypoactive BS. Wound Type: None       Current Nutrition Intake & Therapies:    Average Meal Intake: NPO  Average Supplements Intake: NPO  Diet NPO  Current Tube Feeding (TF) Orders:  Feeding Route: Orogastric  Formula: Peptide Based  Schedule: Continuous  Feeding Regimen: Vital AF at goal rate of 60 ml/hr x 20 hr  Additives/Modulars: None  Water Flushes: 60 ml H2O q 4 hr  Current TF Provides: Vital AF at goal rate of 60 ml/hr to provide 1200 ml TV, 1440 kcal, 90 gm protein, and 973 ml free water + 60 ml free water flush q 4 hr    Anthropometric

## 2024-12-13 NOTE — PLAN OF CARE
Problem: Discharge Planning  Goal: Discharge to home or other facility with appropriate resources  Outcome: Progressing  Flowsheets (Taken 12/13/2024 0016)  Discharge to home or other facility with appropriate resources:   Identify barriers to discharge with patient and caregiver   Identify discharge learning needs (meds, wound care, etc)   Refer to discharge planning if patient needs post-hospital services based on physician order or complex needs related to functional status, cognitive ability or social support system   Arrange for needed discharge resources and transportation as appropriate     Problem: Pain  Goal: Verbalizes/displays adequate comfort level or baseline comfort level  Outcome: Progressing  Flowsheets (Taken 12/13/2024 0016)  Verbalizes/displays adequate comfort level or baseline comfort level:   Administer analgesics based on type and severity of pain and evaluate response   Assess pain using appropriate pain scale     Problem: Safety - Adult  Goal: Free from fall injury  Outcome: Progressing  Flowsheets (Taken 12/13/2024 0016)  Free From Fall Injury:   Instruct family/caregiver on patient safety   Based on caregiver fall risk screen, instruct family/caregiver to ask for assistance with transferring infant if caregiver noted to have fall risk factors     Problem: ABCDS Injury Assessment  Goal: Absence of physical injury  Outcome: Progressing  Flowsheets (Taken 12/13/2024 0016)  Absence of Physical Injury: Implement safety measures based on patient assessment     Problem: Safety - Medical Restraint  Goal: Remains free of injury from restraints (Restraint for Interference with Medical Device)  Description: INTERVENTIONS:  1. Determine that other, less restrictive measures have been tried or would not be effective before applying the restraint  2. Evaluate the patient's condition at the time of restraint application  3. Inform patient/family regarding the reason for restraint  4. Q2H: Monitor

## 2024-12-13 NOTE — ACP (ADVANCE CARE PLANNING)
Writer spoke with pt's son Jose Juan, pt has four adult children all in town.  All children would be involved in decision making per [place] law, since pt does not have any ACP documents on file, including in [facility] via Care Everywhere.  RAMON Leggett-RN

## 2024-12-13 NOTE — PROCEDURES
INTERPRETATION:  This 25-minute, computer-assisted video EEG recording is abnormal due to the followings.    1.  Continuous generalized periodic discharges (GPDs).  2.  Moderate to severe continuous generalized slowing background activity with frequent EEG attenuation.      The findings were consistent with anoxic brain injury.  However, this cannot completely exclude nonconvulsive status epilepticus.  Clinical correlation is recommended.    CLASSIFICATION:  Dysrhythmia grade 3, GPDs  EKG channel.    DESCRIPTION:    BACKGROUND: The EEG background showed continuous generalized low amplitude intermixed 2 to 7 Hz theta/delta activity with frequent EEG attenuation.  The EEG showed fair variability and reactivity.  There was no significant change on the EEG background with photic stimulation.  Hyperventilation was omitted due to patient's condition.    INTERICTAL DISCHARGES: Continuous generalized periodic discharges with frequency less than 1 Hz.  There was no significant evolution or fluctuation with this pattern.    CLINICAL EVENTS:  None    The EKG channel was unremarkable.

## 2024-12-13 NOTE — CONSULTS
[facility] - INITIAL CONSULTATION        CHIEF COMPLAINT  Cardiac arrest      HISTORY OF PRESENTING ILLNESS  Julio Covington is a 64 y.o. patient with advanced squamous cell carcinoma who presented to the hospital after a cardiac arrest and unresponsiveness during outpatient chemotherapy.  He was noted to be in PEA on EMS arrival and was given CPR.  CPR was continued after arrival to the emergency and eventually ROSC was obtained.  He was intubated.  At the time of my evaluation, he seems to be responding to pain and moving all 4 extremities spontaneously.  I am unable to find any cardiac history in his chart.      PAST MEDICAL HISTORY   has no past medical history on file.    SURGICAL HISTORY   has no past surgical history on file.     SOCIAL HISTORY        FAMILY HISTORY  No family history of premature coronary artery disease, aortic disease, or valve disease.    HOME CARDIAC MEDICATIONS  Lasix 20 daily  Crestor 20 at bedtime    ALLERGIES  Patient has no known allergies.     REVIEW OF SYSTEMS  14 point ROS done and negative other than HPI      PHYSICAL EXAMINATION    Vitals:    12/13/24 0000   BP: 100/76   Pulse: 81   Resp:    Temp: 98.3 °F (36.8 °C)   SpO2: 94%    Weight - Scale: 106.3 kg (234 lb 6.4 oz)       General appearance - sedated, intubated and slightly combative  Neck - Supple, symmetrical, trachea midline, no adenopathy, thyroid: not enlarged, symmetric, no tenderness/mass/nodules, no carotid bruit or JVD  Lungs -+ coarse breath sounds bilaterally, respirations unlabored  Chest wall - No tenderness or deformity  Heart - Regular rate and rhythm, S1, S2 normal, no murmur, no rub or gallop  Extremities - Extremities normal, atraumatic, no cyanosis or edema  Pulses - 2+ and symmetric upper and lower extremities       LABS  No results found for: \"LDL\", \"LDLDIRECT\"      CARDIAC STUDIES    EKG: Sinus tachycardia with PVCs.  Right bundle branch block.    Echo: 12/12/2024    Left Ventricle: Normal left

## 2024-12-14 ENCOUNTER — APPOINTMENT (OUTPATIENT)
Dept: MRI IMAGING | Age: 64
DRG: 915 | End: 2024-12-14
Payer: MEDICARE

## 2024-12-14 PROBLEM — G93.40 ACUTE ENCEPHALOPATHY: Status: ACTIVE | Noted: 2024-12-14

## 2024-12-14 LAB
ALBUMIN SERPL-MCNC: 3.2 G/DL (ref 3.4–5)
ALP SERPL-CCNC: 59 U/L (ref 40–129)
ALT SERPL-CCNC: 133 U/L (ref 10–40)
ANION GAP SERPL CALCULATED.3IONS-SCNC: 12 MMOL/L (ref 3–16)
AST SERPL-CCNC: 45 U/L (ref 15–37)
BASE EXCESS BLDA CALC-SCNC: 1.5 MMOL/L (ref -3–3)
BASOPHILS # BLD: 0 K/UL (ref 0–0.2)
BASOPHILS NFR BLD: 0.2 %
BILIRUB DIRECT SERPL-MCNC: <0.1 MG/DL (ref 0–0.3)
BILIRUB INDIRECT SERPL-MCNC: ABNORMAL MG/DL (ref 0–1)
BILIRUB SERPL-MCNC: <0.2 MG/DL (ref 0–1)
BUN SERPL-MCNC: 31 MG/DL (ref 7–20)
CALCIUM SERPL-MCNC: 8.2 MG/DL (ref 8.3–10.6)
CHLORIDE SERPL-SCNC: 105 MMOL/L (ref 99–110)
CO2 BLDA-SCNC: 26.6 MMOL/L
CO2 SERPL-SCNC: 23 MMOL/L (ref 21–32)
COHGB MFR BLDA: 0.2 % (ref 0–1.5)
CREAT SERPL-MCNC: 1.1 MG/DL (ref 0.8–1.3)
DEPRECATED RDW RBC AUTO: 20 % (ref 12.4–15.4)
EOSINOPHIL # BLD: 0 K/UL (ref 0–0.6)
EOSINOPHIL NFR BLD: 0 %
GFR SERPLBLD CREATININE-BSD FMLA CKD-EPI: 74 ML/MIN/{1.73_M2}
GLUCOSE BLD-MCNC: 142 MG/DL (ref 70–99)
GLUCOSE BLD-MCNC: 142 MG/DL (ref 70–99)
GLUCOSE BLD-MCNC: 157 MG/DL (ref 70–99)
GLUCOSE SERPL-MCNC: 145 MG/DL (ref 70–99)
HCO3 BLDA-SCNC: 25.4 MMOL/L (ref 21–29)
HCT VFR BLD AUTO: 24.6 % (ref 40.5–52.5)
HGB BLD-MCNC: 7.9 G/DL (ref 13.5–17.5)
HGB BLDA-MCNC: 9.5 G/DL (ref 13.5–17.5)
LACTATE BLDV-SCNC: 1.5 MMOL/L (ref 0.4–2)
LACTATE BLDV-SCNC: 2.2 MMOL/L (ref 0.4–2)
LYMPHOCYTES # BLD: 0.3 K/UL (ref 1–5.1)
LYMPHOCYTES NFR BLD: 4.8 %
MAGNESIUM SERPL-MCNC: 2.04 MG/DL (ref 1.8–2.4)
MCH RBC QN AUTO: 31.7 PG (ref 26–34)
MCHC RBC AUTO-ENTMCNC: 32.1 G/DL (ref 31–36)
MCV RBC AUTO: 98.8 FL (ref 80–100)
METHGB MFR BLDA: 0.3 %
MONOCYTES # BLD: 0.3 K/UL (ref 0–1.3)
MONOCYTES NFR BLD: 5.2 %
NEUTROPHILS # BLD: 5.8 K/UL (ref 1.7–7.7)
NEUTROPHILS NFR BLD: 89.8 %
O2 THERAPY: ABNORMAL
PCO2 BLDA: 37.2 MMHG (ref 35–45)
PERFORMED ON: ABNORMAL
PH BLDA: 7.45 [PH] (ref 7.35–7.45)
PHOSPHATE SERPL-MCNC: 3.2 MG/DL (ref 2.5–4.9)
PLATELET # BLD AUTO: 150 K/UL (ref 135–450)
PMV BLD AUTO: 8.6 FL (ref 5–10.5)
PO2 BLDA: 72.8 MMHG (ref 75–108)
POTASSIUM SERPL-SCNC: 3.9 MMOL/L (ref 3.5–5.1)
PROT SERPL-MCNC: 5.5 G/DL (ref 6.4–8.2)
RBC # BLD AUTO: 2.49 M/UL (ref 4.2–5.9)
SAO2 % BLDA: 94.4 %
SODIUM SERPL-SCNC: 140 MMOL/L (ref 136–145)
WBC # BLD AUTO: 6.4 K/UL (ref 4–11)

## 2024-12-14 PROCEDURE — 2580000003 HC RX 258: Performed by: STUDENT IN AN ORGANIZED HEALTH CARE EDUCATION/TRAINING PROGRAM

## 2024-12-14 PROCEDURE — 6370000000 HC RX 637 (ALT 250 FOR IP): Performed by: STUDENT IN AN ORGANIZED HEALTH CARE EDUCATION/TRAINING PROGRAM

## 2024-12-14 PROCEDURE — 99222 1ST HOSP IP/OBS MODERATE 55: CPT | Performed by: STUDENT IN AN ORGANIZED HEALTH CARE EDUCATION/TRAINING PROGRAM

## 2024-12-14 PROCEDURE — 6360000002 HC RX W HCPCS: Performed by: PSYCHIATRY & NEUROLOGY

## 2024-12-14 PROCEDURE — 80069 RENAL FUNCTION PANEL: CPT

## 2024-12-14 PROCEDURE — 83605 ASSAY OF LACTIC ACID: CPT

## 2024-12-14 PROCEDURE — 70551 MRI BRAIN STEM W/O DYE: CPT

## 2024-12-14 PROCEDURE — 85025 COMPLETE CBC W/AUTO DIFF WBC: CPT

## 2024-12-14 PROCEDURE — 83735 ASSAY OF MAGNESIUM: CPT

## 2024-12-14 PROCEDURE — 82803 BLOOD GASES ANY COMBINATION: CPT

## 2024-12-14 PROCEDURE — 2000000000 HC ICU R&B

## 2024-12-14 PROCEDURE — 2700000000 HC OXYGEN THERAPY PER DAY

## 2024-12-14 PROCEDURE — 94761 N-INVAS EAR/PLS OXIMETRY MLT: CPT

## 2024-12-14 PROCEDURE — 6360000002 HC RX W HCPCS: Performed by: STUDENT IN AN ORGANIZED HEALTH CARE EDUCATION/TRAINING PROGRAM

## 2024-12-14 PROCEDURE — 94003 VENT MGMT INPAT SUBQ DAY: CPT

## 2024-12-14 PROCEDURE — 80076 HEPATIC FUNCTION PANEL: CPT

## 2024-12-14 RX ADMIN — SENNOSIDES 8.6 MG: 8.6 TABLET, FILM COATED ORAL at 08:22

## 2024-12-14 RX ADMIN — ENOXAPARIN SODIUM 100 MG: 100 INJECTION SUBCUTANEOUS at 20:09

## 2024-12-14 RX ADMIN — Medication 10 ML: at 20:10

## 2024-12-14 RX ADMIN — AMPICILLIN SODIUM AND SULBACTAM SODIUM 3000 MG: 2; 1 INJECTION, POWDER, FOR SOLUTION INTRAMUSCULAR; INTRAVENOUS at 04:06

## 2024-12-14 RX ADMIN — ENOXAPARIN SODIUM 100 MG: 100 INJECTION SUBCUTANEOUS at 08:22

## 2024-12-14 RX ADMIN — AMPICILLIN SODIUM AND SULBACTAM SODIUM 3000 MG: 2; 1 INJECTION, POWDER, FOR SOLUTION INTRAMUSCULAR; INTRAVENOUS at 22:08

## 2024-12-14 RX ADMIN — AMPICILLIN SODIUM AND SULBACTAM SODIUM 3000 MG: 2; 1 INJECTION, POWDER, FOR SOLUTION INTRAMUSCULAR; INTRAVENOUS at 16:51

## 2024-12-14 RX ADMIN — MINERAL OIL, PETROLATUM: 425; 568 OINTMENT OPHTHALMIC at 04:05

## 2024-12-14 RX ADMIN — MINERAL OIL, PETROLATUM: 425; 568 OINTMENT OPHTHALMIC at 20:09

## 2024-12-14 RX ADMIN — CHLORHEXIDINE GLUCONATE 15 ML: 1.2 RINSE ORAL at 20:11

## 2024-12-14 RX ADMIN — MUPIROCIN: 20 OINTMENT TOPICAL at 20:09

## 2024-12-14 RX ADMIN — LEVETIRACETAM 1500 MG: 100 INJECTION INTRAVENOUS at 19:05

## 2024-12-14 RX ADMIN — MINERAL OIL, PETROLATUM: 425; 568 OINTMENT OPHTHALMIC at 18:21

## 2024-12-14 RX ADMIN — MINERAL OIL, PETROLATUM: 425; 568 OINTMENT OPHTHALMIC at 12:00

## 2024-12-14 RX ADMIN — MUPIROCIN: 20 OINTMENT TOPICAL at 08:21

## 2024-12-14 RX ADMIN — CHLORHEXIDINE GLUCONATE 15 ML: 1.2 RINSE ORAL at 08:21

## 2024-12-14 RX ADMIN — AMPICILLIN SODIUM AND SULBACTAM SODIUM 3000 MG: 2; 1 INJECTION, POWDER, FOR SOLUTION INTRAMUSCULAR; INTRAVENOUS at 11:01

## 2024-12-14 RX ADMIN — LEVETIRACETAM 1500 MG: 100 INJECTION INTRAVENOUS at 06:47

## 2024-12-14 RX ADMIN — MINERAL OIL, PETROLATUM: 425; 568 OINTMENT OPHTHALMIC at 00:21

## 2024-12-14 RX ADMIN — SENNOSIDES 8.6 MG: 8.6 TABLET, FILM COATED ORAL at 20:10

## 2024-12-14 RX ADMIN — DOCUSATE SODIUM LIQUID 100 MG: 100 LIQUID ORAL at 20:10

## 2024-12-14 RX ADMIN — MINERAL OIL, PETROLATUM: 425; 568 OINTMENT OPHTHALMIC at 08:21

## 2024-12-14 RX ADMIN — Medication 10 ML: at 08:22

## 2024-12-14 RX ADMIN — PANTOPRAZOLE SODIUM 40 MG: 40 INJECTION, POWDER, FOR SOLUTION INTRAVENOUS at 08:22

## 2024-12-14 ASSESSMENT — PULMONARY FUNCTION TESTS
PIF_VALUE: 19
PIF_VALUE: 20
PIF_VALUE: 12
PIF_VALUE: 16
PIF_VALUE: 21
PIF_VALUE: 20
PIF_VALUE: 40
PIF_VALUE: 18
PIF_VALUE: 16
PIF_VALUE: 18
PIF_VALUE: 20
PIF_VALUE: 20
PIF_VALUE: 18
PIF_VALUE: 24
PIF_VALUE: 20
PIF_VALUE: 11
PIF_VALUE: 16
PIF_VALUE: 18
PIF_VALUE: 21
PIF_VALUE: 20
PIF_VALUE: 15
PIF_VALUE: 16
PIF_VALUE: 18
PIF_VALUE: 19
PIF_VALUE: 20
PIF_VALUE: 8

## 2024-12-14 NOTE — PLAN OF CARE
Problem: Discharge Planning  Goal: Discharge to home or other facility with appropriate resources  Outcome: Progressing  Flowsheets (Taken 12/13/2024 0016)  Discharge to home or other facility with appropriate resources:   Identify barriers to discharge with patient and caregiver   Identify discharge learning needs (meds, wound care, etc)   Refer to discharge planning if patient needs post-hospital services based on physician order or complex needs related to functional status, cognitive ability or social support system   Arrange for needed discharge resources and transportation as appropriate     Problem: Pain  Goal: Verbalizes/displays adequate comfort level or baseline comfort level  Outcome: Progressing  Flowsheets (Taken 12/13/2024 0016)  Verbalizes/displays adequate comfort level or baseline comfort level:   Administer analgesics based on type and severity of pain and evaluate response   Assess pain using appropriate pain scale     Problem: Safety - Adult  Goal: Free from fall injury  Outcome: Progressing  Flowsheets (Taken 12/13/2024 0016)  Free From Fall Injury:   Instruct family/caregiver on patient safety   Based on caregiver fall risk screen, instruct family/caregiver to ask for assistance with transferring infant if caregiver noted to have fall risk factors     Problem: Nutrition Deficit:  Goal: Optimize nutritional status  Outcome: Progressing  Flowsheets (Taken 12/14/2024 0028)  Nutrient intake appropriate for improving, restoring, or maintaining nutritional needs: Monitor oral intake, labs, and treatment plans
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
## 2024-12-15 ENCOUNTER — HOSPITAL ENCOUNTER (INPATIENT)
Age: 64
LOS: 3 days | End: 2024-12-18
Attending: INTERNAL MEDICINE | Admitting: INTERNAL MEDICINE
Payer: MEDICARE

## 2024-12-15 VITALS
BODY MASS INDEX: 35.22 KG/M2 | HEART RATE: 122 BPM | OXYGEN SATURATION: 98 % | WEIGHT: 224.43 LBS | SYSTOLIC BLOOD PRESSURE: 112 MMHG | RESPIRATION RATE: 17 BRPM | TEMPERATURE: 100.2 F | DIASTOLIC BLOOD PRESSURE: 75 MMHG | HEIGHT: 67 IN

## 2024-12-15 LAB
ALBUMIN SERPL-MCNC: 3.2 G/DL (ref 3.4–5)
ALP SERPL-CCNC: 61 U/L (ref 40–129)
ALT SERPL-CCNC: 86 U/L (ref 10–40)
ANION GAP SERPL CALCULATED.3IONS-SCNC: 11 MMOL/L (ref 3–16)
AST SERPL-CCNC: 41 U/L (ref 15–37)
BASE EXCESS BLDA CALC-SCNC: -0.2 MMOL/L (ref -3–3)
BILIRUB DIRECT SERPL-MCNC: <0.1 MG/DL (ref 0–0.3)
BILIRUB INDIRECT SERPL-MCNC: ABNORMAL MG/DL (ref 0–1)
BILIRUB SERPL-MCNC: <0.2 MG/DL (ref 0–1)
BUN SERPL-MCNC: 28 MG/DL (ref 7–20)
CALCIUM SERPL-MCNC: 8.3 MG/DL (ref 8.3–10.6)
CHLORIDE SERPL-SCNC: 107 MMOL/L (ref 99–110)
CO2 BLDA-SCNC: 24.2 MMOL/L
CO2 SERPL-SCNC: 24 MMOL/L (ref 21–32)
COHGB MFR BLDA: 1 % (ref 0–1.5)
CREAT SERPL-MCNC: 0.8 MG/DL (ref 0.8–1.3)
DEPRECATED RDW RBC AUTO: 20.1 % (ref 12.4–15.4)
GFR SERPLBLD CREATININE-BSD FMLA CKD-EPI: >90 ML/MIN/{1.73_M2}
GLUCOSE SERPL-MCNC: 137 MG/DL (ref 70–99)
HCO3 BLDA-SCNC: 23.2 MMOL/L (ref 21–29)
HCT VFR BLD AUTO: 22.3 % (ref 40.5–52.5)
HGB BLD-MCNC: 7.2 G/DL (ref 13.5–17.5)
HGB BLDA-MCNC: 7.9 G/DL (ref 13.5–17.5)
MAGNESIUM SERPL-MCNC: 2.06 MG/DL (ref 1.8–2.4)
MCH RBC QN AUTO: 32 PG (ref 26–34)
MCHC RBC AUTO-ENTMCNC: 32.4 G/DL (ref 31–36)
MCV RBC AUTO: 98.5 FL (ref 80–100)
METHGB MFR BLDA: 0.2 %
O2 THERAPY: ABNORMAL
PCO2 BLDA: 32.6 MMHG (ref 35–45)
PH BLDA: 7.47 [PH] (ref 7.35–7.45)
PHOSPHATE SERPL-MCNC: 2.7 MG/DL (ref 2.5–4.9)
PLATELET # BLD AUTO: 113 K/UL (ref 135–450)
PMV BLD AUTO: 8.7 FL (ref 5–10.5)
PO2 BLDA: 79.2 MMHG (ref 75–108)
POTASSIUM SERPL-SCNC: 3.6 MMOL/L (ref 3.5–5.1)
PROT SERPL-MCNC: 5.6 G/DL (ref 6.4–8.2)
RBC # BLD AUTO: 2.26 M/UL (ref 4.2–5.9)
SAO2 % BLDA: 95.2 %
SODIUM SERPL-SCNC: 142 MMOL/L (ref 136–145)
WBC # BLD AUTO: 5.6 K/UL (ref 4–11)

## 2024-12-15 PROCEDURE — 6360000002 HC RX W HCPCS: Performed by: INTERNAL MEDICINE

## 2024-12-15 PROCEDURE — 94003 VENT MGMT INPAT SUBQ DAY: CPT

## 2024-12-15 PROCEDURE — 94761 N-INVAS EAR/PLS OXIMETRY MLT: CPT

## 2024-12-15 PROCEDURE — 6360000002 HC RX W HCPCS: Performed by: STUDENT IN AN ORGANIZED HEALTH CARE EDUCATION/TRAINING PROGRAM

## 2024-12-15 PROCEDURE — 3E033XZ INTRODUCTION OF VASOPRESSOR INTO PERIPHERAL VEIN, PERCUTANEOUS APPROACH: ICD-10-PCS | Performed by: INTERNAL MEDICINE

## 2024-12-15 PROCEDURE — 80069 RENAL FUNCTION PANEL: CPT

## 2024-12-15 PROCEDURE — 83735 ASSAY OF MAGNESIUM: CPT

## 2024-12-15 PROCEDURE — 6360000002 HC RX W HCPCS: Performed by: PSYCHIATRY & NEUROLOGY

## 2024-12-15 PROCEDURE — 2580000003 HC RX 258: Performed by: STUDENT IN AN ORGANIZED HEALTH CARE EDUCATION/TRAINING PROGRAM

## 2024-12-15 PROCEDURE — 82803 BLOOD GASES ANY COMBINATION: CPT

## 2024-12-15 PROCEDURE — 2700000000 HC OXYGEN THERAPY PER DAY

## 2024-12-15 PROCEDURE — 85027 COMPLETE CBC AUTOMATED: CPT

## 2024-12-15 PROCEDURE — 1200000000 HC SEMI PRIVATE

## 2024-12-15 PROCEDURE — 6370000000 HC RX 637 (ALT 250 FOR IP): Performed by: STUDENT IN AN ORGANIZED HEALTH CARE EDUCATION/TRAINING PROGRAM

## 2024-12-15 PROCEDURE — 5A12012 PERFORMANCE OF CARDIAC OUTPUT, SINGLE, MANUAL: ICD-10-PCS | Performed by: INTERNAL MEDICINE

## 2024-12-15 PROCEDURE — 80076 HEPATIC FUNCTION PANEL: CPT

## 2024-12-15 RX ORDER — GLYCOPYRROLATE 0.2 MG/ML
0.2 INJECTION INTRAMUSCULAR; INTRAVENOUS EVERY 4 HOURS PRN
Status: DISCONTINUED | OUTPATIENT
Start: 2024-12-15 | End: 2024-12-18 | Stop reason: HOSPADM

## 2024-12-15 RX ORDER — MORPHINE SULFATE 4 MG/ML
4 INJECTION, SOLUTION INTRAMUSCULAR; INTRAVENOUS
Status: DISCONTINUED | OUTPATIENT
Start: 2024-12-15 | End: 2024-12-18

## 2024-12-15 RX ORDER — MORPHINE SULFATE 2 MG/ML
2 INJECTION, SOLUTION INTRAMUSCULAR; INTRAVENOUS
Status: DISCONTINUED | OUTPATIENT
Start: 2024-12-15 | End: 2024-12-18

## 2024-12-15 RX ORDER — MORPHINE SULFATE 4 MG/ML
4 INJECTION, SOLUTION INTRAMUSCULAR; INTRAVENOUS
Status: DISCONTINUED | OUTPATIENT
Start: 2024-12-15 | End: 2024-12-15

## 2024-12-15 RX ORDER — LORAZEPAM 2 MG/ML
1 CONCENTRATE ORAL
Status: DISCONTINUED | OUTPATIENT
Start: 2024-12-15 | End: 2024-12-17

## 2024-12-15 RX ORDER — GLYCOPYRROLATE 0.2 MG/ML
0.2 INJECTION INTRAMUSCULAR; INTRAVENOUS EVERY 4 HOURS PRN
Status: DISCONTINUED | OUTPATIENT
Start: 2024-12-15 | End: 2024-12-15 | Stop reason: HOSPADM

## 2024-12-15 RX ORDER — LORAZEPAM 2 MG/ML
2 INJECTION INTRAMUSCULAR
Status: DISCONTINUED | OUTPATIENT
Start: 2024-12-15 | End: 2024-12-15 | Stop reason: HOSPADM

## 2024-12-15 RX ORDER — MORPHINE SULFATE 4 MG/ML
4 INJECTION, SOLUTION INTRAMUSCULAR; INTRAVENOUS
Status: DISCONTINUED | OUTPATIENT
Start: 2024-12-15 | End: 2024-12-15 | Stop reason: HOSPADM

## 2024-12-15 RX ORDER — MORPHINE SULFATE 2 MG/ML
2 INJECTION, SOLUTION INTRAMUSCULAR; INTRAVENOUS
Status: DISCONTINUED | OUTPATIENT
Start: 2024-12-15 | End: 2024-12-15

## 2024-12-15 RX ORDER — LORAZEPAM 2 MG/ML
1 INJECTION INTRAMUSCULAR
Status: DISCONTINUED | OUTPATIENT
Start: 2024-12-15 | End: 2024-12-15

## 2024-12-15 RX ORDER — MORPHINE SULFATE 2 MG/ML
2 INJECTION, SOLUTION INTRAMUSCULAR; INTRAVENOUS
Status: DISCONTINUED | OUTPATIENT
Start: 2024-12-15 | End: 2024-12-15 | Stop reason: HOSPADM

## 2024-12-15 RX ADMIN — LEVETIRACETAM 1500 MG: 100 INJECTION INTRAVENOUS at 06:11

## 2024-12-15 RX ADMIN — MORPHINE SULFATE 4 MG: 4 INJECTION, SOLUTION INTRAMUSCULAR; INTRAVENOUS at 14:23

## 2024-12-15 RX ADMIN — CHLORHEXIDINE GLUCONATE 15 ML: 1.2 RINSE ORAL at 07:55

## 2024-12-15 RX ADMIN — MORPHINE SULFATE 4 MG: 4 INJECTION, SOLUTION INTRAMUSCULAR; INTRAVENOUS at 17:10

## 2024-12-15 RX ADMIN — MUPIROCIN: 20 OINTMENT TOPICAL at 07:55

## 2024-12-15 RX ADMIN — GLYCOPYRROLATE 0.2 MG: 0.2 INJECTION INTRAMUSCULAR; INTRAVENOUS at 19:41

## 2024-12-15 RX ADMIN — DOCUSATE SODIUM LIQUID 100 MG: 100 LIQUID ORAL at 07:55

## 2024-12-15 RX ADMIN — GLYCOPYRROLATE 0.2 MG: 0.2 INJECTION INTRAMUSCULAR; INTRAVENOUS at 14:34

## 2024-12-15 RX ADMIN — Medication 10 ML: at 08:08

## 2024-12-15 RX ADMIN — LORAZEPAM 2 MG: 2 INJECTION INTRAMUSCULAR; INTRAVENOUS at 14:51

## 2024-12-15 RX ADMIN — MORPHINE SULFATE 4 MG: 4 INJECTION, SOLUTION INTRAMUSCULAR; INTRAVENOUS at 16:45

## 2024-12-15 RX ADMIN — MINERAL OIL, PETROLATUM: 425; 568 OINTMENT OPHTHALMIC at 04:46

## 2024-12-15 RX ADMIN — Medication 10 ML: at 19:41

## 2024-12-15 RX ADMIN — LORAZEPAM 2 MG: 2 INJECTION INTRAMUSCULAR; INTRAVENOUS at 16:53

## 2024-12-15 RX ADMIN — LORAZEPAM 2 MG: 2 INJECTION INTRAMUSCULAR; INTRAVENOUS at 16:38

## 2024-12-15 RX ADMIN — MORPHINE SULFATE 4 MG: 4 INJECTION, SOLUTION INTRAMUSCULAR; INTRAVENOUS at 19:41

## 2024-12-15 RX ADMIN — MINERAL OIL, PETROLATUM: 425; 568 OINTMENT OPHTHALMIC at 07:55

## 2024-12-15 RX ADMIN — LORAZEPAM 1 MG: 2 INJECTION INTRAMUSCULAR; INTRAVENOUS at 14:23

## 2024-12-15 RX ADMIN — PANTOPRAZOLE SODIUM 40 MG: 40 INJECTION, POWDER, FOR SOLUTION INTRAVENOUS at 07:54

## 2024-12-15 RX ADMIN — SENNOSIDES 8.6 MG: 8.6 TABLET, FILM COATED ORAL at 07:55

## 2024-12-15 RX ADMIN — MINERAL OIL, PETROLATUM: 425; 568 OINTMENT OPHTHALMIC at 19:42

## 2024-12-15 RX ADMIN — MORPHINE SULFATE 4 MG: 4 INJECTION, SOLUTION INTRAMUSCULAR; INTRAVENOUS at 16:30

## 2024-12-15 RX ADMIN — MINERAL OIL, PETROLATUM: 425; 568 OINTMENT OPHTHALMIC at 00:02

## 2024-12-15 RX ADMIN — AMPICILLIN SODIUM AND SULBACTAM SODIUM 3000 MG: 2; 1 INJECTION, POWDER, FOR SOLUTION INTRAMUSCULAR; INTRAVENOUS at 04:47

## 2024-12-15 RX ADMIN — MORPHINE SULFATE 4 MG: 4 INJECTION, SOLUTION INTRAMUSCULAR; INTRAVENOUS at 14:51

## 2024-12-15 RX ADMIN — ENOXAPARIN SODIUM 100 MG: 100 INJECTION SUBCUTANEOUS at 07:55

## 2024-12-15 RX ADMIN — MORPHINE SULFATE 4 MG: 4 INJECTION, SOLUTION INTRAMUSCULAR; INTRAVENOUS at 22:42

## 2024-12-15 ASSESSMENT — PULMONARY FUNCTION TESTS
PIF_VALUE: 21
PIF_VALUE: 24
PIF_VALUE: 16
PIF_VALUE: 17
PIF_VALUE: 11
PIF_VALUE: 9
PIF_VALUE: 17
PIF_VALUE: 12
PIF_VALUE: 12
PIF_VALUE: 16
PIF_VALUE: 24
PIF_VALUE: 8
PIF_VALUE: 13
PIF_VALUE: 18
PIF_VALUE: 8
PIF_VALUE: 17
PIF_VALUE: 18
PIF_VALUE: 13
PIF_VALUE: 16
PIF_VALUE: 18

## 2024-12-15 ASSESSMENT — PAIN SCALES - GENERAL
PAINLEVEL_OUTOF10: 10
PAINLEVEL_OUTOF10: 10
PAINLEVEL_OUTOF10: 0
PAINLEVEL_OUTOF10: 0
PAINLEVEL_OUTOF10: 10
PAINLEVEL_OUTOF10: 0

## 2024-12-15 NOTE — ACP (ADVANCE CARE PLANNING)
ADVANCED CARE PLANNING    Name:Julio Covington       :  1960              MRN:  [id number]  Admission Date: 2024  1:05 PM  Date of Discussion:  12/15/2024    Purpose of Encounter: Advanced care planning in light of anoxic brain injury.  Parties in attendance: :GREGORY Myers MD, Family members:Son and family.  Decisional Capacity:Yes      Objective/Medical Story: 64 y.o. adult who presented to [facility] with cardiac arrest.  PMHx significant for stage IV SCC.       Patient was getting chemotherapy at office when he became unresponsive when EMS arrived patient was hypoxic and cyanotic he was in PEA arrest was given epinephrine and CPR and in ED when he arrived he received 4-5 rounds of ACLS eventually achieved ROSC.  There was concern potentially the patient had anaphylaxis given that he was developing rash but unclear.  In ED patient was found to have lactic acidosis CT PE was negative for PE slightly elevated ALT and AST.  Also had elevated creatinine however do not have a baseline for him.    Active Diagnoses:    Active Hospital Problems    Diagnosis Date Noted    Acute encephalopathy [G93.40] 2024    Elevated troponin [R79.89] 2024    Right bundle branch block [I45.10] 2024    Cardiac arrest [I46.9] 2024       These active diagnoses are of sufficient risk that focused discussion on advance care planning is indicated to allow the patient to thoughtfully consider personal goals of care; and if situations arise that prevent the ability to personally give input, to ensure appropriate representation of their personal desires for different levels and aggressiveness of care.       Goals of Care Determinations: Patient wishes to focus on comfort. Withdrawal from life support. Plan for withdrawal around 2PM  Code Status: Currently patient wishes to be DNR CC         Time Spent on Advanced Planning Documents: 16 mins.    The following items were considered in

## 2024-12-15 NOTE — PROGRESS NOTES
12/12/24 1508   Adult IBW   Height 1.702 m (5' 7\")   Weight - Scale 106.3 kg (234 lb 6.4 oz)   IBW/kg (Calculated) 61.6   Patient Transport   Time Spent Transporting 16-30   Transport Ventillation Type Manual ventilation   Transport From ER   Transport Destination ICU   Emergency Equipment Included Yes       
   12/12/24 1513   Breath Sounds   Right Upper Lobe Diminished   Right Middle Lobe Diminished   Right Lower Lobe Diminished   Left Upper Lobe Diminished   Left Lower Lobe Diminished   Vent Information   Vent Mode AC/PRVC   Ventilator Settings   FiO2  70 %   Insp Time (sec) 1.2 sec   Resp Rate (Set) 16 bpm   PEEP/CPAP (cmH2O) 5   Vent Patient Data (Readings)   Vt (Measured) 451 mL   Peak Inspiratory Pressure (cmH2O) 17 cmH2O   Rate Measured 28 br/min   Minute Volume (L/min) 12.4 Liters   Mean Airway Pressure (cmH2O) 12 cmH20   Plateau Pressure (cm H2O) 0 cm H2O   Driving Pressure -5   I:E Ratio 1.40:1   I Time/ I Time % 1.2 s   Backup Apnea On   Vent Alarm Settings   High Pressure (cmH2O) 45 cmH2O   Low Minute Volume (lpm) 2 L/min   Low Exhaled Vt (ml) 200 mL   RR High (bpm) 40 br/min   Apnea (secs) 20 secs   Additional Respiratoray Assessments   Humidification Source HME   Circuit Condensation Drained   Ambu Bag With Mask At Bedside Yes   Airway Clearance   Suction ET Tube   Suction Device Inline suction catheter   Sputum Method Obtained Endotracheal   Sputum Amount Small   Sputum Color/Odor Tan   Sputum Consistency Thick   ETT    Placement Date/Time: 12/12/24 1256   Placed By: Licensed provider  Placement Verified By: Capnometry;Auscultation;Direct visualization   Secured At 26 cm   Measured From Lips   ETT Placement Center   Secured By Commercial tube [facility]aza   Site Assessment Dry   Cuff Pressure 30 cm H2O       
   12/12/24 1927   Vent Information   Vent Mode AC/PRVC   Ventilator Settings   FiO2  40 %   Resp Rate (Set) 16 bpm   PEEP/CPAP (cmH2O) 5   Vt (Set, mL) 45 mL   Vent Patient Data (Readings)   Vt (Measured) 538 mL   Peak Inspiratory Pressure (cmH2O) 18 cmH2O   Rate Measured 18 br/min   Minute Volume (L/min) 8.85 Liters   Mean Airway Pressure (cmH2O) 10 cmH20   I:E Ratio 1:2.0   Vent Alarm Settings   High Pressure (cmH2O) 45 cmH2O   Low Minute Volume (lpm) 2 L/min   High Minute Volume (lpm) 20 L/min   Low Exhaled Vt (ml) 200 mL   High Exhaled Vt (ml) 1000 mL   Additional Respiratoray Assessments   Humidification Source HME   Airway Clearance   Suction Oral   Subglottic Suction Done Yes   Suction Device Silvestre   ETT    Placement Date/Time: 12/12/24 1256   Placed By: Licensed provider  Placement Verified By: Capnometry;Auscultation;Direct visualization   Secured At 26 cm   Measured From Lips   ETT Placement Left   Secured By Commercial tube [facility]   Cuff Pressure 30 cm H2O       
   12/12/24 7794   Vent Information   Vent Mode AC/PRVC   Ventilator Settings   FiO2  40 %   Resp Rate (Set) 16 bpm   PEEP/CPAP (cmH2O) 5   Vt (Set, mL) 450 mL   Vent Patient Data (Readings)   Vt (Measured) 486 mL   Peak Inspiratory Pressure (cmH2O) 18 cmH2O   Rate Measured 20 br/min   Minute Volume (L/min) 11 Liters   Mean Airway Pressure (cmH2O) 10 cmH20   Vent Alarm Settings   High Pressure (cmH2O) 45 cmH2O   Low Minute Volume (lpm) 2 L/min   High Minute Volume (lpm) 20 L/min   Low Exhaled Vt (ml) 200 mL   High Exhaled Vt (ml) 1000 mL   Additional Respiratoray Assessments   Humidification Source HME   Airway Clearance   Suction ET Tube   Subglottic Suction Done Yes   Suction Device Inline suction catheter   Sputum Method Obtained Endotracheal   Sputum Amount Moderate   Sputum Color/Odor Yellow;White   Sputum Consistency Thick   ETT    Placement Date/Time: 12/12/24 1256   Placed By: Licensed provider  Placement Verified By: Capnometry;Auscultation;Direct visualization   Secured At 26 cm   Measured From Lips   ETT Placement Right   Secured By Commercial tube [facility]   Cuff Pressure 30 cm H2O       
   12/13/24 0323   Vent Information   Vent Mode AC/PRVC   Ventilator Settings   FiO2  (S)  30 %   Resp Rate (Set) 16 bpm   PEEP/CPAP (cmH2O) 5   Vt (Set, mL) 450 mL   Vent Patient Data (Readings)   Vt (Measured) 450 mL   Peak Inspiratory Pressure (cmH2O) 15 cmH2O   Rate Measured 16 br/min   Minute Volume (L/min) 7.2 Liters   Mean Airway Pressure (cmH2O) 8.5 cmH20   I:E Ratio 1:2.1   Vent Alarm Settings   High Pressure (cmH2O) 45 cmH2O   Low Minute Volume (lpm) 2 L/min   High Minute Volume (lpm) 20 L/min   Low Exhaled Vt (ml) 200 mL   High Exhaled Vt (ml) 1000 mL   Additional Respiratoray Assessments   Humidification Source HME   ETT    Placement Date/Time: 12/12/24 1256   Placed By: Licensed provider  Placement Verified By: Capnometry;Auscultation;Direct visualization   Secured At 26 cm   Measured From Lips   ETT Placement Center   Secured By Commercial tube [facility]   Site Assessment Dry   Cuff Pressure 30 cm H2O       
   12/13/24 0804   Patient Observation   Pulse 87   Respirations 17   SpO2 95 %   Breath Sounds   Right Upper Lobe Diminished   Right Middle Lobe Diminished   Right Lower Lobe Diminished   Left Upper Lobe Diminished   Left Lower Lobe Diminished   Vent Information   Vent Mode AC/PRVC   Ventilator Settings   FiO2  30 %   Insp Time (sec) 1.2 sec   Resp Rate (Set) 16 bpm   PEEP/CPAP (cmH2O) 5   Vt (Set, mL) 450 mL   Vent Patient Data (Readings)   Vt (Measured) 491 mL   Peak Inspiratory Pressure (cmH2O) 16 cmH2O   Rate Measured 17 br/min   Minute Volume (L/min) 7.81 Liters   Mean Airway Pressure (cmH2O) 8.8 cmH20   Plateau Pressure (cm H2O) 0 cm H2O   Driving Pressure -5   I:E Ratio 1:2.10   I Time/ I Time % 1.2 s   Vent Alarm Settings   High Pressure (cmH2O) 45 cmH2O   Low Minute Volume (lpm) 2 L/min   Low Exhaled Vt (ml) 200 mL   RR High (bpm) 40 br/min   Apnea (secs) 20 secs   Additional Respiratoray Assessments   Humidification Source HME   Ambu Bag With Mask At Bedside Yes   Airway Clearance   Suction ET Tube   Suction Device Inline suction catheter   Sputum Method Obtained Endotracheal   Sputum Amount Small   Sputum Color/Odor White;Clear   Sputum Consistency Thick   ETT    Placement Date/Time: 12/12/24 1256   Placed By: Licensed provider  Placement Verified By: Capnometry;Auscultation;Direct visualization   Secured At 26 cm   Measured From Lips   ETT Placement Left   Secured By Commercial tube [facility]   Cuff Pressure 30 cm H2O       
   12/13/24 1206   Patient Observation   Pulse (!) 114   Respirations 19   SpO2 96 %   Breath Sounds   Right Upper Lobe Diminished   Right Middle Lobe Diminished   Right Lower Lobe Diminished   Left Upper Lobe Diminished   Left Lower Lobe Diminished   Vent Information   Vent Mode AC/PRVC   Ventilator Settings   FiO2  30 %   Insp Time (sec) 1.2 sec   Resp Rate (Set) 16 bpm   PEEP/CPAP (cmH2O) 5   Vt (Set, mL) 450 mL   Vent Patient Data (Readings)   Vt (Measured) 572 mL   Peak Inspiratory Pressure (cmH2O) 23 cmH2O   Rate Measured 21 br/min   Minute Volume (L/min) 12.7 Liters   Mean Airway Pressure (cmH2O) 15 cmH20   Plateau Pressure (cm H2O) 0 cm H2O   Driving Pressure -5   I:E Ratio 1:2.10   I Time/ I Time % 1.2 s   Vent Alarm Settings   High Pressure (cmH2O) 45 cmH2O   Low Minute Volume (lpm) 2 L/min   Low Exhaled Vt (ml) 200 mL   RR High (bpm) 40 br/min   Apnea (secs) 20 secs   Airway Clearance   Suction ET Tube   Suction Device Inline suction catheter   Sputum Method Obtained Endotracheal   Sputum Amount Small   Sputum Color/Odor White;Yellow   Sputum Consistency Thick   ETT    Placement Date/Time: 12/12/24 1256   Placed By: Licensed provider  Placement Verified By: Capnometry;Auscultation;Direct visualization   Secured At 26 cm   Measured From Lips   ETT Placement Center   Secured By Commercial tube [facility]       
   12/13/24 1540   Patient Observation   Pulse (!) 114   Respirations 16   SpO2 96 %   Breath Sounds   Right Upper Lobe Diminished   Right Middle Lobe Diminished   Right Lower Lobe Diminished   Left Upper Lobe Diminished   Left Lower Lobe Diminished   Vent Information   Equipment Changed HME   Vent Mode AC/PRVC   Ventilator Settings   FiO2  30 %   Insp Time (sec) 1.2 sec   Resp Rate (Set) 16 bpm   PEEP/CPAP (cmH2O) 5   Vt (Set, mL) 450 mL   Vent Patient Data (Readings)   Vt (Measured) 599 mL   Peak Inspiratory Pressure (cmH2O) 14 cmH2O   Rate Measured 17 br/min   Minute Volume (L/min) 9.04 Liters   Mean Airway Pressure (cmH2O) 8.2 cmH20   Plateau Pressure (cm H2O) 0 cm H2O   Driving Pressure -5   I:E Ratio 1:3.10   I Time/ I Time % 1.2 s   Vent Alarm Settings   High Pressure (cmH2O) 45 cmH2O   Low Minute Volume (lpm) 2 L/min   Low Exhaled Vt (ml) 200 mL   RR High (bpm) 40 br/min   Apnea (secs) 20 secs   Additional Respiratoray Assessments   Humidification Source HME   Ambu Bag With Mask At Bedside Yes   Airway Clearance   Suction ET Tube   Subglottic Suction Done Yes   Suction Device Inline suction catheter   Sputum Method Obtained Endotracheal   Sputum Amount Small   Sputum Color/Odor White;Clear   Sputum Consistency Thick   ETT    Placement Date/Time: 12/12/24 1256   Placed By: Licensed provider  Placement Verified By: Capnometry;Auscultation;Direct visualization   Secured At 26 cm   Measured From Lips   ETT Placement Right   Secured By Commercial tube [facility]       
   12/13/24 1919   Patient Observation   Pulse (!) 102   Respirations 16   SpO2 97 %   Breath Sounds   Right Upper Lobe Diminished   Right Middle Lobe Diminished   Right Lower Lobe Diminished   Left Upper Lobe Diminished   Left Lower Lobe Diminished   Vent Information   Vent Mode AC/PRVC   Ventilator Settings   FiO2  30 %   Insp Time (sec) 1.2 sec   Resp Rate (Set) 16 bpm   PEEP/CPAP (cmH2O) 5   Vt (Set, mL) 450 mL   Vent Patient Data (Readings)   Vt (Measured) 585 mL   Peak Inspiratory Pressure (cmH2O) 8 cmH2O   Rate Measured 18 br/min   Minute Volume (L/min) 8.75 Liters   Mean Airway Pressure (cmH2O) 7 cmH20   Plateau Pressure (cm H2O) 0 cm H2O   Driving Pressure -5   I:E Ratio 1:2.10   I Time/ I Time % 1.2 s   Backup Apnea On   Vent Alarm Settings   High Pressure (cmH2O) 45 cmH2O   Low Minute Volume (lpm) 2 L/min   Low Exhaled Vt (ml) 200 mL   RR High (bpm) 40 br/min   Apnea (secs) 20 secs   Additional Respiratoray Assessments   Humidification Source HME   Circuit Condensation Drained   Ambu Bag With Mask At Bedside Yes   Airway Clearance   Suction ET Tube   Suction Device Inline suction catheter   Sputum Method Obtained Endotracheal   Sputum Amount Moderate   Sputum Color/Odor Yellow;White   Sputum Consistency Thick;Thin   ETT    Placement Date/Time: 12/12/24 1256   Placed By: Licensed provider  Placement Verified By: Capnometry;Auscultation;Direct visualization   Secured At 26 cm   Measured From Lips   ETT Placement Right  ([facility])   Secured By Commercial tube [facility]   Site Assessment Dry   Cuff Pressure 30 cm H2O       
   12/13/24 2309   Patient Observation   Pulse 97   Respirations 17   SpO2 96 %   Breath Sounds   Breath Sounds Bilateral Diminished   Vent Information   Vent Mode AC/PRVC   Ventilator Settings   FiO2  30 %   Insp Time (sec) 1.2 sec   Resp Rate (Set) 16 bpm   PEEP/CPAP (cmH2O) 5   Vt (Set, mL) 450 mL   Vent Patient Data (Readings)   Vt (Measured) 551 mL   Peak Inspiratory Pressure (cmH2O) 8 cmH2O   Rate Measured 17 br/min   Minute Volume (L/min) 9.39 Liters   Mean Airway Pressure (cmH2O) 6.4 cmH20   Plateau Pressure (cm H2O) 0 cm H2O   Driving Pressure -5   I:E Ratio 1:2.00   Flow Sensitivity 3 L/min   I Time/ I Time % 1 s   Backup Apnea On   Backup Rate 16 Breaths Per Minute   Backup Vt 450   Vent Alarm Settings   High Pressure (cmH2O) 40 cmH2O   Low Minute Volume (lpm) 2 L/min   High Minute Volume (lpm) 20 L/min   Low Exhaled Vt (ml) 200 mL   High Exhaled Vt (ml) 1000 mL   RR High (bpm) 40 br/min   Apnea (secs) 20 secs   Additional Respiratoray Assessments   Humidification Source HME   Ambu Bag With Mask At Bedside Yes   ETT    Placement Date/Time: 12/12/24 1256   Placed By: Licensed provider  Placement Verified By: Capnometry;Auscultation;Direct visualization   Secured At 26 cm   Measured From Lips   ETT Placement Left   Secured By Commercial tube [facility]   Site Assessment Dry   Cuff Pressure 30 cm H2O       
   12/14/24 0302   Patient Observation   Pulse 96   Respirations 15   SpO2 96 %   Breath Sounds   Breath Sounds Bilateral Clear;Diminished   Vent Information   Vent Mode AC/PRVC   Ventilator Settings   FiO2  25 %   Insp Time (sec) 1.2 sec   Resp Rate (Set) 16 bpm   PEEP/CPAP (cmH2O) 5   Vt (Set, mL) 450 mL   Vent Patient Data (Readings)   Vt (Measured) 566 mL   Peak Inspiratory Pressure (cmH2O) 12 cmH2O   Rate Measured 18 br/min   Minute Volume (L/min) 8.3 Liters   Mean Airway Pressure (cmH2O) 7.9 cmH20   Plateau Pressure (cm H2O) 0 cm H2O   Driving Pressure -5   I:E Ratio 1:2.30   Flow Sensitivity 3 L/min   I Time/ I Time % 1 s   Backup Apnea On   Backup Rate 16 Breaths Per Minute   Backup Vt 450   Vent Alarm Settings   High Pressure (cmH2O) 40 cmH2O   Low Minute Volume (lpm) 2 L/min   High Minute Volume (lpm) 20 L/min   Low Exhaled Vt (ml) 200 mL   High Exhaled Vt (ml) 1000 mL   RR High (bpm) 40 br/min   Apnea (secs) 20 secs   Additional Respiratoray Assessments   Humidification Source HME   Ambu Bag With Mask At Bedside Yes   ETT    Placement Date/Time: 12/12/24 1256   Placed By: Licensed provider  Placement Verified By: Capnometry;Auscultation;Direct visualization   Secured At 26 cm   Measured From Lips   ETT Placement Center   Secured By Commercial tube [facility]   Site Assessment Dry   Cuff Pressure 30 cm H2O       
   12/14/24 0816   Patient Observation   Pulse 95   Respirations 12   SpO2 94 %   Vent Information   Vent Mode (S)  CPAP/PS   Ventilator Settings   FiO2  25 %   PEEP/CPAP (cmH2O) 5   Pressure Support (cm H2O) 10 cm H2O   Vent Patient Data (Readings)   Vt (Measured) 750 mL   Peak Inspiratory Pressure (cmH2O) 16 cmH2O   Rate Measured 12 br/min   Minute Volume (L/min) 9.22 Liters   Mean Airway Pressure (cmH2O) 7.9 cmH20   Plateau Pressure (cm H2O) 0 cm H2O   Driving Pressure -5   I:E Ratio 1:3.50   Vent Alarm Settings   High Pressure (cmH2O) 40 cmH2O   Low Minute Volume (lpm) 2 L/min   Low Exhaled Vt (ml) 200 mL   RR High (bpm) 40 br/min   Apnea (secs) 20 secs   Additional Respiratoray Assessments   Humidification Source HME   Ambu Bag With Mask At Bedside Yes   Airway Clearance   Suction ET Tube;Oral   Sputum Color/Odor White   Sputum Consistency Frothy   ETT    Placement Date/Time: 12/12/24 1256   Placed By: Licensed provider  Placement Verified By: Capnometry;Auscultation;Direct visualization   Secured At 26 cm   Measured From Lips   ETT Placement Right   Secured By Commercial tube [facility]   Site Assessment Dry   Cuff Pressure 30 cm H2O        12/14/24 0816   Patient Observation   Pulse 95   Respirations 12   SpO2 94 %   Vent Information   Vent Mode (S)  CPAP/PS   Ventilator Settings   FiO2  25 %   PEEP/CPAP (cmH2O) 5   Pressure Support (cm H2O) 10 cm H2O   Vent Patient Data (Readings)   Vt (Measured) 750 mL   Peak Inspiratory Pressure (cmH2O) 16 cmH2O   Rate Measured 12 br/min   Minute Volume (L/min) 9.22 Liters   Mean Airway Pressure (cmH2O) 7.9 cmH20   Plateau Pressure (cm H2O) 0 cm H2O   Driving Pressure -5   I:E Ratio 1:3.50   Vent Alarm Settings   High Pressure (cmH2O) 40 cmH2O   Low Minute Volume (lpm) 2 L/min   Low Exhaled Vt (ml) 200 mL   RR High (bpm) 40 br/min   Apnea (secs) 20 secs   Additional Respiratoray Assessments   Humidification Source HME   Ambu Bag With Mask At Bedside Yes   Airway Clearance 
   12/14/24 1026   Patient Observation   Pulse 89   Respirations 16   SpO2 94 %   Vent Information   Vent Mode (S)  AC/PC   Ventilator Settings   FiO2  25 %   Resp Rate (Set) 16 bpm   PEEP/CPAP (cmH2O) 5   Set Pressure 15   Vent Patient Data (Readings)   Vt (Measured) 611 mL   Peak Inspiratory Pressure (cmH2O) 20 cmH2O   Rate Measured 16 br/min   Minute Volume (L/min) 9.76 Liters   Mean Airway Pressure (cmH2O) 9.2 cmH20   Plateau Pressure (cm H2O) 0 cm H2O   Driving Pressure -5   I:E Ratio 1:2.80   I Time/ I Time % 1 s   Vent Alarm Settings   High Pressure (cmH2O) 40 cmH2O   Low Minute Volume (lpm) 2 L/min   RR High (bpm) 40 br/min       
   12/14/24 1122   Patient Observation   Pulse 94   Respirations 16   SpO2 94 %   Breath Sounds   Breath Sounds Bilateral Diminished   Vent Information   Vent Mode AC/PC   Ventilator Settings   FiO2  25 %   Resp Rate (Set) 16 bpm   PEEP/CPAP (cmH2O) 5   Pressure Support (cm H2O) 1 cm H2O   Set Pressure 15   Vent Patient Data (Readings)   Vt (Measured) 588 mL   Peak Inspiratory Pressure (cmH2O) 20 cmH2O   Rate Measured 16 br/min   Minute Volume (L/min) 9.63 Liters   Mean Airway Pressure (cmH2O) 9.2 cmH20   Plateau Pressure (cm H2O) 0 cm H2O   Driving Pressure -5   I:E Ratio 1:2.80   I Time/ I Time % 1 s   Vent Alarm Settings   High Pressure (cmH2O) 40 cmH2O   Low Minute Volume (lpm) 2 L/min   Low Exhaled Vt (ml) 200 mL   RR High (bpm) 40 br/min   Apnea (secs) 20 secs   Additional Respiratoray Assessments   Humidification Source HME   Ambu Bag With Mask At Bedside Yes   ETT    Placement Date/Time: 12/12/24 1256   Placed By: Licensed provider  Placement Verified By: Capnometry;Auscultation;Direct visualization   Secured At 26 cm   Measured From Lips   ETT Placement Center   Secured By Commercial tube [facility]   Site Assessment Dry   Cuff Pressure 30 cm H2O       
   12/14/24 1630   Patient Transport   Time Spent Transporting 46-60   Transport Ventillation Type Transport vent   Transport From ICU   Transport Destination MRI   Transport Destination ICU       
   12/14/24 2112   Patient Observation   Pulse 100   Respirations 14   SpO2 (!) 89 %   Breath Sounds   Right Upper Lobe Diminished   Right Middle Lobe Diminished   Right Lower Lobe Diminished   Left Upper Lobe Diminished   Left Lower Lobe Diminished   Vent Information   Vent Mode AC/PRVC   Ventilator Settings   FiO2  25 %   Insp Time (sec) 1 sec   Resp Rate (Set) 16 bpm   PEEP/CPAP (cmH2O) 5   Vt (Set, mL) 500 mL   Vent Patient Data (Readings)   Vt (Measured) 155 mL   Peak Inspiratory Pressure (cmH2O) 19 cmH2O   Rate Measured 18 br/min   Minute Volume (L/min) 9.81 Liters   Mean Airway Pressure (cmH2O) 9.6 cmH20   Plateau Pressure (cm H2O) 0 cm H2O   Driving Pressure -5   I:E Ratio 1.20:1   Flow Sensitivity 3 L/min   I Time/ I Time % 1 s   Backup Apnea On   Vent Alarm Settings   High Pressure (cmH2O) 40 cmH2O   Low Minute Volume (lpm) 2 L/min   High Minute Volume (lpm) 20 L/min   Low Exhaled Vt (ml) 100 mL   High Exhaled Vt (ml) 1000 mL   RR High (bpm) 40 br/min   Apnea (secs) 20 secs   Additional Respiratoray Assessments   Humidification Source HME   Ambu Bag With Mask At Bedside Yes   ETT    Placement Date/Time: 12/12/24 1256   Placed By: Licensed provider  Placement Verified By: Capnometry;Auscultation;Direct visualization   Secured At 26 cm   Measured From Lips   ETT Placement Right   Secured By Commercial tube [facility]   Site Assessment Dry   Cuff Pressure 30 cm H2O       
   12/14/24 2344   Patient Observation   Pulse (!) 104   Respirations 16   SpO2 94 %   Breath Sounds   Right Upper Lobe Rhonchi;Inspiratory wheezes   Right Middle Lobe Diminished   Right Lower Lobe Diminished   Left Upper Lobe Rhonchi   Left Lower Lobe Diminished   Vent Information   Vent Mode AC/PRVC   Ventilator Settings   FiO2  25 %   Insp Time (sec) 1 sec   Resp Rate (Set) 16 bpm   PEEP/CPAP (cmH2O) 5   Vt (Set, mL) 500 mL   Vent Patient Data (Readings)   Vt (Measured) 538 mL   Peak Inspiratory Pressure (cmH2O) 24 cmH2O   Rate Measured 22 br/min   Minute Volume (L/min) 11.9 Liters   Mean Airway Pressure (cmH2O) 11 cmH20   Plateau Pressure (cm H2O) 0 cm H2O   Driving Pressure -5   Inspiratory Time 1 sec   I:E Ratio 1:2.80   I Time/ I Time % 1 s   Vent Alarm Settings   High Pressure (cmH2O) 40 cmH2O   Low Minute Volume (lpm) 2 L/min   High Minute Volume (lpm) 20 L/min   Low Exhaled Vt (ml) 200 mL   RR High (bpm) 40 br/min   Apnea (secs) 20 secs   Additional Respiratoray Assessments   Humidification Source HME   Ambu Bag With Mask At Bedside Yes   Airway Clearance   Suction ET Tube   Subglottic Suction Done Yes   Suction Device Inline suction catheter   Sputum Method Obtained Endotracheal   Sputum Amount Moderate   Sputum Color/Odor White;Yellow   Sputum Consistency Thin;Thick   ETT    Placement Date/Time: 12/12/24 1256   Placed By: Licensed provider  Placement Verified By: Capnometry;Auscultation;Direct visualization   Secured At 26 cm   Measured From Lips   ETT Placement Center   Secured By Commercial tube [facility]   Site Assessment Dry   Cuff Pressure 30 cm H2O       
   12/15/24 0321   Patient Observation   Pulse (!) 104   Respirations 20   SpO2 94 %   Breath Sounds   Right Upper Lobe Diminished   Right Middle Lobe Diminished   Right Lower Lobe Diminished   Left Upper Lobe Diminished   Left Lower Lobe Diminished   Vent Information   Vent Mode AC/PRVC   Ventilator Settings   FiO2  25 %   Insp Time (sec) 1 sec   Resp Rate (Set) 16 bpm   PEEP/CPAP (cmH2O) 5   Vt (Set, mL) 500 mL   Vent Patient Data (Readings)   Vt (Measured) 562 mL   Peak Inspiratory Pressure (cmH2O) 8 cmH2O   Rate Measured 20 br/min   Minute Volume (L/min) 11.4 Liters   Mean Airway Pressure (cmH2O) 6.4 cmH20   Plateau Pressure (cm H2O) 0 cm H2O   Driving Pressure -5   Inspiratory Time 1 sec   I:E Ratio 1:1.90   I Time/ I Time % 1 s   Vent Alarm Settings   High Pressure (cmH2O) 40 cmH2O   Low Minute Volume (lpm) 2 L/min   High Minute Volume (lpm) 20 L/min   Low Exhaled Vt (ml) 200 mL   RR High (bpm) 40 br/min   Apnea (secs) 20 secs   Additional Respiratoray Assessments   Humidification Source HME   Ambu Bag With Mask At Bedside Yes   Airway Clearance   Suction ET Tube   Subglottic Suction Done Yes   Suction Device Inline suction catheter   Sputum Method Obtained Endotracheal   Sputum Amount Other (comment)  (non productive cough)   ETT    Placement Date/Time: 12/12/24 1256   Placed By: Licensed provider  Placement Verified By: Capnometry;Auscultation;Direct visualization   Secured At 27 cm   Measured From Lips   ETT Placement Left   Secured By Commercial tube [facility]   Site Assessment Dry   Cuff Pressure 30 cm H2O       
   12/15/24 0737   Patient Observation   Pulse 96   Respirations 15   SpO2 94 %   Vent Information   Vent Mode AC/PRVC   Ventilator Settings   FiO2  25 %   Resp Rate (Set) 16 bpm   PEEP/CPAP (cmH2O) 5   Vt (Set, mL) 500 mL   Vent Patient Data (Readings)   Vt (Measured) 641 mL   Peak Inspiratory Pressure (cmH2O) 17 cmH2O   Rate Measured 18 br/min   Minute Volume (L/min) 9.78 Liters   Mean Airway Pressure (cmH2O) 9.3 cmH20   Plateau Pressure (cm H2O) 0 cm H2O   Driving Pressure -5   Inspiratory Time 1 sec   I:E Ratio 1:3.30   Flow Sensitivity 3 L/min   I Time/ I Time % 1 s   Backup Apnea On   Backup Rate 16 Breaths Per Minute   Backup Vt 500   Vent Alarm Settings   High Pressure (cmH2O) 40 cmH2O   Low Minute Volume (lpm) 2 L/min   High Minute Volume (lpm) 20 L/min   Low Exhaled Vt (ml) 200 mL   High Exhaled Vt (ml) 1000 mL   RR High (bpm) 40 br/min   Apnea (secs) 20 secs   Additional Respiratoray Assessments   Humidification Source HME   Ambu Bag With Mask At Bedside Yes   ETT    Placement Date/Time: 12/12/24 1256   Placed By: Licensed provider  Placement Verified By: Capnometry;Auscultation;Direct visualization   Secured At 26 cm   Measured From Lips   ETT Placement Center   Secured By Commercial tube peraza   Site Assessment Dry   Cuff Pressure 30 cm H2O       
   12/15/24 1144   Patient Observation   Pulse 100   Respirations 17   SpO2 94 %   Vent Information   Vent Mode AC/PRVC   Ventilator Settings   FiO2  25 %   Insp Time (sec) 1 sec   Resp Rate (Set) 16 bpm   PEEP/CPAP (cmH2O) 5   Vt (Set, mL) 500 mL   Vent Patient Data (Readings)   Vt (Measured) 817 mL   Peak Inspiratory Pressure (cmH2O) 18 cmH2O   Rate Measured 17 br/min   Minute Volume (L/min) 8.58 Liters   Mean Airway Pressure (cmH2O) 9.3 cmH20   Plateau Pressure (cm H2O) 0 cm H2O   Driving Pressure -5   Inspiratory Time 1 sec   I:E Ratio 1:2.80   Flow Sensitivity 3 L/min   I Time/ I Time % 1 s   Backup Apnea On   Vent Alarm Settings   High Pressure (cmH2O) 40 cmH2O   Low Minute Volume (lpm) 2 L/min   RR High (bpm) 40 br/min   Additional Respiratoray Assessments   Humidification Source HME   Ambu Bag With Mask At Bedside Yes   ETT    Placement Date/Time: 12/12/24 1256   Placed By: Licensed provider  Placement Verified By: Capnometry;Auscultation;Direct visualization   Secured At 26 cm   Measured From Lips   ETT Placement Right   Secured By Commercial tube peraza   Site Assessment Dry       
  [facility] - Daily Progress/Follow-up Note      Admit Date:  12/12/2024    CHIEF COMPLAINT  PEA arrest      INTERVAL HISTORY:  Mr. Covington remains intubated.  Off sedation this morning but without meaningful neurologic response.  Hemodynamically stable.      TELEMETRY: No significant arrhythmias noted overnight      REVIEW OF SYSTEMS  10 point ROS not done, patient intubated and not responsive      CARDIAC MEDICATIONS  None    Family, medical and social history reviewed and updated as necessary.      VITALS  /87   Pulse (!) 102   Temp 99.6 °F (37.6 °C) (Bladder)   Resp 16   Ht 1.702 m (5' 7\")   Wt 103.4 kg (227 lb 15.3 oz)   SpO2 97%   BMI 35.70 kg/m²     Intake/Output Summary (Last 24 hours) at 12/13/2024 2208  Last data filed at 12/13/2024 1957  Gross per 24 hour   Intake 892.67 ml   Output 1140 ml   Net -247.33 ml       General appearance - sedated, intubated and slightly combative  Neck - Supple, symmetrical, trachea midline, no adenopathy, thyroid: not enlarged, symmetric, no tenderness/mass/nodules, no carotid bruit or JVD  Lungs -+ coarse breath sounds bilaterally, respirations unlabored  Chest wall - No tenderness or deformity  Heart - Regular rate and rhythm, S1, S2 normal, no murmur, no rub or gallop  Extremities - Extremities normal, atraumatic, no cyanosis or edema  Pulses - 2+ and symmetric upper and lower extremities      LABS:  No results found for: \"LDL\", \"LDLDIRECT\"        CARDIAC STUDIES    EKG: Sinus tachycardia with PVCs.  Right bundle branch block.     Echo: 12/12/2024    Left Ventricle: Normal left ventricular systolic function with a visually estimated EF of 50 - 55%. Mild septal thickening. There are regional wall motion abnormalities. Questionable hypokinesis of the following segments: mid anteroseptal. Normal diastolic function.    Aorta: Normal sized aortic root. Mildly dilated ascending aorta. Ao ascending diameter is 4.0 cm.    Image quality is technically 
  Pulmonary & Critical Care Medicine ICU Progress Note      Events of Last 24 hours:   No acute events overnight. Pt not responding off sedation/analgesia.      Invasive Lines: PICC D#None   CVC D#None  Art Line D#None            Vitals:  BP 96/76   Pulse 82   Temp 98.4 °F (36.9 °C) (Bladder)   Resp 18   Ht 1.702 m (5' 7\")   Wt 103.4 kg (227 lb 15.3 oz)   SpO2 98%   BMI 35.70 kg/m²    Tmax:  CVP:        Intake/Output Summary (Last 24 hours) at 12/13/2024 0726  Last data filed at 12/13/2024 0606  Gross per 24 hour   Intake 778.14 ml   Output 1060 ml   Net -281.86 ml       EXAM:  Physical Exam  Constitutional:       Appearance: He is ill-appearing.      Comments: Not following commands on vent, some neuro responses/twitching   HENT:      Head: Normocephalic and atraumatic.      Nose: Nose normal.      Mouth/Throat:      Pharynx: No oropharyngeal exudate.   Eyes:      General: No scleral icterus.        Right eye: No discharge.         Left eye: No discharge.   Cardiovascular:      Rate and Rhythm: Normal rate.      Heart sounds: No murmur heard.     No gallop.   Pulmonary:      Effort: Pulmonary effort is normal. No respiratory distress.      Breath sounds: No wheezing or rales.   Abdominal:      General: Abdomen is flat. Bowel sounds are normal. There is no distension.      Tenderness: There is no abdominal tenderness.   Musculoskeletal:         General: No swelling.      Cervical back: Normal range of motion.   Skin:     General: Skin is warm and dry.   Neurological:      Mental Status: He is alert.      Comments: Off sedation, pt not following commands           Medications:  Scheduled Meds:   mupirocin   Each Nostril BID    sodium chloride flush  5-40 mL IntraVENous 2 times per day    ampicillin-sulbactam  3,000 mg IntraVENous Q6H    chlorhexidine  15 mL Mouth/Throat BID    pantoprazole  40 mg IntraVENous Daily    insulin lispro  0-4 Units SubCUTAneous 4x Daily AC & HS       PRN Meds:  prochlorperazine, 
  Pulmonary & Critical Care Medicine ICU Progress Note      Events of Last 24 hours:   Pt intubated and not making any purposeful movements on no sedation. He had his MRI yesterday.       Invasive Lines: PICC D#None   CVC D#None  Art Line D#None            Vitals:  /78   Pulse 100   Temp 100.2 °F (37.9 °C) (Bladder)   Resp 17   Ht 1.702 m (5' 7\")   Wt 101.8 kg (224 lb 6.9 oz)   SpO2 94%   BMI 35.15 kg/m²    Tmax:  CVP:        Intake/Output Summary (Last 24 hours) at 12/15/2024 1234  Last data filed at 12/15/2024 0521  Gross per 24 hour   Intake 1851.01 ml   Output 1175 ml   Net 676.01 ml       EXAM:  Physical Exam  Constitutional:       Appearance: He is ill-appearing.      Comments: Intubated, no sedated   HENT:      Head: Normocephalic and atraumatic.      Nose: Nose normal.      Mouth/Throat:      Pharynx: No oropharyngeal exudate.   Eyes:      General: No scleral icterus.        Right eye: No discharge.         Left eye: No discharge.   Cardiovascular:      Rate and Rhythm: Normal rate.      Heart sounds: No murmur heard.     No gallop.   Pulmonary:      Effort: Pulmonary effort is normal.      Breath sounds: Rales present. No wheezing.   Abdominal:      General: Abdomen is flat. Bowel sounds are normal. There is no distension.      Tenderness: There is no abdominal tenderness.   Musculoskeletal:         General: Swelling present.      Cervical back: Normal range of motion.   Skin:     General: Skin is warm and dry.   Neurological:      Comments: Not making any purposeful movements off sedation           Medications:  Scheduled Meds:   mupirocin   Each Nostril BID    artificial tears   Both Eyes 6 times per day    senna  1 tablet Oral BID    docusate  100 mg Oral BID    enoxaparin  100 mg SubCUTAneous BID    lactated ringers  500 mL IntraVENous Once    levETIRAcetam  1,500 mg IntraVENous Q12H    sodium chloride flush  5-40 mL IntraVENous 2 times per day    ampicillin-sulbactam  3,000 mg IntraVENous 
  Pulmonary & Critical Care Medicine ICU Progress Note      Events of Last 24 hours:   Pt not on sedation and not responding to voice. He has been off sedation for 24 hours.       Invasive Lines: PICC D#None   CVC D#None  Art Line D#None            Vitals:  /85   Pulse 95   Temp 99.7 °F (37.6 °C) (Core)   Resp 12   Ht 1.702 m (5' 7\")   Wt 102.4 kg (225 lb 12 oz)   SpO2 94%   BMI 35.36 kg/m²    Tmax:  CVP:        Intake/Output Summary (Last 24 hours) at 12/14/2024 0929  Last data filed at 12/14/2024 0800  Gross per 24 hour   Intake 993.69 ml   Output 1670 ml   Net -676.31 ml       EXAM:  Physical Exam  Constitutional:       Appearance: He is ill-appearing.      Comments: Not responding to voice/commands   HENT:      Head: Normocephalic and atraumatic.      Nose: Nose normal.      Mouth/Throat:      Pharynx: No oropharyngeal exudate.   Eyes:      General: No scleral icterus.        Right eye: No discharge.         Left eye: No discharge.   Cardiovascular:      Rate and Rhythm: Normal rate.      Heart sounds: No murmur heard.     No gallop.   Pulmonary:      Effort: Pulmonary effort is normal. No respiratory distress.      Breath sounds: No wheezing or rales.   Abdominal:      General: Abdomen is flat. Bowel sounds are normal. There is no distension.      Tenderness: There is no abdominal tenderness.   Musculoskeletal:         General: No swelling.      Cervical back: Normal range of motion.   Skin:     General: Skin is warm and dry.   Neurological:      Mental Status: He is oriented to person, place, and time.          Medications:  Scheduled Meds:   mupirocin   Each Nostril BID    artificial tears   Both Eyes 6 times per day    senna  1 tablet Oral BID    docusate  100 mg Oral BID    enoxaparin  100 mg SubCUTAneous BID    lactated ringers  500 mL IntraVENous Once    levETIRAcetam  1,500 mg IntraVENous Q12H    sodium chloride flush  5-40 mL IntraVENous 2 times per day    ampicillin-sulbactam  3,000 mg 
1430: Pt given morphine and ativan per order and then extubated per order and familys wishes. Prn robinul given for secretions once extubated. Family and  in room. Comfort given to family. [facility] called and they ordered to call back once cardiac time of death.   
Day team: please be aware that hospice would like to do a discharge and readmit.  Thank you.  
EEG was completed.   
I called Julio Covington's son Jose Juan about MRI result showing extensive anoxic brain injury. Neurological prognosis is poor but significant uncertainty exists. Jose Juan explained that given this news and in the context of stage IV SCC that he wants to discuss this with family and come to a final decision about goals of care tomorrow morning assuming he remains neurologically and hemodynamically stable. Neurology signing off but reach out if there are additional questions or concerns.     Electronically signed by Samir Delarosa MD on 12/14/2024 at 7:35 PM    
Pt transported to MRI with RT and 2 RN  
Pt's mother and son arrived to visit. Rn inquired about \"confidential encounter\" status in pt's chart. Pt's mother Larissa, the pt's emergency contact/primary decision maker, notified RN that this stipulation no longer needed to be associated with the pt's chart. Registration notified.  
The EEG showed continuous generalized periodic discharges.  This pattern can be seen in severe anoxic brain injury or nonconvulsive status epilepticus.  Recommend levetiracetam 1500 mg twice daily.  Consider neurology consult.  
Tube feed started @10 mL/hr with Q4 60 mL flushes per order. Mouth care given, oral secretions suctioned. Pt repositioned. Family at bedside.  
  Temp:  100.2 °F (37.9 °C)     TempSrc:  Bladder     SpO2: 94% 94% 94% 94%   Weight:       Height:             Physical Exam:      Physical Exam  Constitutional:       Comments: Intubated. Eyes open and not responding to commands   HENT:      Head: Normocephalic and atraumatic.      Right Ear: External ear normal.      Left Ear: External ear normal.   Cardiovascular:      Rate and Rhythm: Normal rate and regular rhythm.      Pulses: Normal pulses.      Heart sounds: Normal heart sounds. No murmur heard.     No friction rub. No gallop.   Pulmonary:      Breath sounds: Normal breath sounds.   Abdominal:      General: Abdomen is flat.   Skin:     General: Skin is warm and dry.         Medications:   Medications:    mupirocin   Each Nostril BID    artificial tears   Both Eyes 6 times per day    senna  1 tablet Oral BID    docusate  100 mg Oral BID    enoxaparin  100 mg SubCUTAneous BID    lactated ringers  500 mL IntraVENous Once    levETIRAcetam  1,500 mg IntraVENous Q12H    sodium chloride flush  5-40 mL IntraVENous 2 times per day    ampicillin-sulbactam  3,000 mg IntraVENous Q6H    chlorhexidine  15 mL Mouth/Throat BID    pantoprazole  40 mg IntraVENous Daily    insulin lispro  0-4 Units SubCUTAneous 4x Daily AC & HS      Infusions:    sodium chloride Stopped (12/12/24 1631)    dextrose       PRN Meds: prochlorperazine, 10 mg, Q6H PRN  sodium chloride flush, 5-40 mL, PRN  sodium chloride, , PRN  polyethylene glycol, 17 g, Daily PRN  acetaminophen, 650 mg, Q6H PRN   Or  acetaminophen, 650 mg, Q6H PRN  glucose, 4 tablet, PRN  dextrose bolus, 125 mL, PRN   Or  dextrose bolus, 250 mL, PRN  glucagon (rDNA), 1 mg, PRN  dextrose, , Continuous PRN        Labs and Imaging   Echo (TTE) complete (PRN contrast/bubble/strain/3D)    Result Date: 12/12/2024    Left Ventricle: Normal left ventricular systolic function with a visually estimated EF of 50 - 55%. Mild septal thickening. There are regional wall motion abnormalities. 
mediastinum may imply a sternal fracture.  Correlate with potential history of chest compressions. 3. Questionable right anterior rib fractures may be in association. 4. Multifocal airspace opacities.  Aspiration or ARDS may be suspected.     CT HEAD WO CONTRAST    Result Date: 12/12/2024  EXAMINATION: CT OF THE HEAD WITHOUT CONTRAST  12/12/2024 1:25 pm TECHNIQUE: CT of the head was performed without the administration of intravenous contrast. Automated exposure control, iterative reconstruction, and/or weight based adjustment of the mA/kV was utilized to reduce the radiation dose to as low as reasonably achievable. COMPARISON: None. HISTORY: ORDERING SYSTEM PROVIDED HISTORY: ROSC TECHNOLOGIST PROVIDED HISTORY: Reason for exam:->ROSC Has a \"code stroke\" or \"stroke alert\" been called?->No Decision Support Exception - unselect if not a suspected or confirmed emergency medical condition->Emergency Medical Condition (MA) Reason for Exam: ROSC FINDINGS: Evaluation is limited by significant motion artifact. BRAIN/VENTRICLES: There is no acute intracranial hemorrhage, mass effect or midline shift.  No abnormal extra-axial fluid collection.  The gray-white differentiation is maintained without evidence of an acute infarct.  There is no evidence of hydrocephalus. There is severe volume loss.  Intracranial atherosclerosis is present. ORBITS: The visualized portion of the orbits demonstrate no acute abnormality. SINUSES: There is patchy opacification of the paranasal sinuses. SOFT TISSUES/SKULL:  No acute abnormality of the visualized skull or soft tissues.     No acute intracranial abnormality. Severe volume loss.     XR CHEST PORTABLE    Result Date: 12/12/2024  EXAMINATION: ONE XRAY VIEW OF THE CHEST 12/12/2024 1:03 pm COMPARISON: None. HISTORY: ORDERING SYSTEM PROVIDED HISTORY: ETT placement TECHNOLOGIST PROVIDED HISTORY: Reason for exam:->ETT placement FINDINGS: There is an ET tube in place, in the midtrachea.  There is a 
is patchy opacification of the paranasal sinuses. SOFT TISSUES/SKULL:  No acute abnormality of the visualized skull or soft tissues.     No acute intracranial abnormality. Severe volume loss.     XR CHEST PORTABLE    Result Date: 12/12/2024  EXAMINATION: ONE XRAY VIEW OF THE CHEST 12/12/2024 1:03 pm COMPARISON: None. HISTORY: ORDERING SYSTEM PROVIDED HISTORY: ETT placement TECHNOLOGIST PROVIDED HISTORY: Reason for exam:->ETT placement FINDINGS: There is an ET tube in place, in the midtrachea.  There is a port in place with distal tip overlying the superior vena cava.  There is mild pulmonary vascular congestion and cardiomegaly, suggestive of mild CHF.  Bony structures appear normal.  Visualized upper abdomen appears normal.     1. ET tube in place, in the midtrachea. 2. Mild CHF.       CBC:   Recent Labs     12/12/24  1309 12/13/24  0345 12/14/24  0415   WBC 6.1 9.3 6.4   HGB 10.9* 9.3* 7.9*    163 150     BMP:    Recent Labs     12/13/24  0345 12/13/24  0807 12/14/24  0415    140 140   K 4.7 4.3 3.9    106 105   CO2 19* 21 23   BUN 25* 24* 31*   CREATININE 1.2 1.1 1.1   GLUCOSE 160* 149* 145*     Hepatic:   Recent Labs     12/12/24  1407 12/13/24  0345 12/14/24  0415   * 105* 45*   * 205* 133*   BILITOT <0.2 <0.2 <0.2   ALKPHOS 100 74 59     Lipids: No results found for: \"CHOL\", \"HDL\", \"TRIG\"  Hemoglobin A1C: No results found for: \"LABA1C\"  TSH: No results found for: \"TSH\"  Troponin: No results found for: \"TROPONINT\"  Lactic Acid:   Recent Labs     12/13/24  2025 12/14/24  0018 12/14/24  0756   LACTA 2.3* 2.2* 1.5     BNP: No results for input(s): \"PROBNP\" in the last 72 hours.  UA:No results found for: \"NITRU\", \"COLORU\", \"PHUR\", \"LABCAST\", \"WBCUA\", \"RBCUA\", \"MUCUS\", \"TRICHOMONAS\", \"YEAST\", \"BACTERIA\", \"CLARITYU\", \"SPECGRAV\", \"LEUKOCYTESUR\", \"UROBILINOGEN\", \"BILIRUBINUR\", \"BLOODU\", \"GLUCOSEU\", \"KETUA\", \"AMORPHOUS\"  Urine Cultures: No results found for: \"LABURIN\"  Blood Cultures:

## 2024-12-15 NOTE — CARE COORDINATION
CM noted hospice consult and spoke with patients son, Jose Juan. They would like to speak with hospice and have no agency preference. Referral made to [facility] of [facility].

## 2024-12-16 LAB
BACTERIA BLD CULT ORG #2: NORMAL
BACTERIA BLD CULT: NORMAL
PATH INTERP BLD-IMP: NORMAL

## 2024-12-16 PROCEDURE — 94761 N-INVAS EAR/PLS OXIMETRY MLT: CPT

## 2024-12-16 PROCEDURE — 1200000000 HC SEMI PRIVATE

## 2024-12-16 PROCEDURE — 6360000002 HC RX W HCPCS: Performed by: STUDENT IN AN ORGANIZED HEALTH CARE EDUCATION/TRAINING PROGRAM

## 2024-12-16 PROCEDURE — 2700000000 HC OXYGEN THERAPY PER DAY

## 2024-12-16 RX ADMIN — GLYCOPYRROLATE 0.2 MG: 0.2 INJECTION INTRAMUSCULAR; INTRAVENOUS at 00:31

## 2024-12-16 RX ADMIN — MORPHINE SULFATE 4 MG: 4 INJECTION, SOLUTION INTRAMUSCULAR; INTRAVENOUS at 10:29

## 2024-12-16 RX ADMIN — MORPHINE SULFATE 4 MG: 4 INJECTION, SOLUTION INTRAMUSCULAR; INTRAVENOUS at 05:13

## 2024-12-16 RX ADMIN — GLYCOPYRROLATE 0.2 MG: 0.2 INJECTION INTRAMUSCULAR; INTRAVENOUS at 10:29

## 2024-12-16 RX ADMIN — MORPHINE SULFATE 4 MG: 4 INJECTION, SOLUTION INTRAMUSCULAR; INTRAVENOUS at 15:18

## 2024-12-16 RX ADMIN — GLYCOPYRROLATE 0.2 MG: 0.2 INJECTION INTRAMUSCULAR; INTRAVENOUS at 15:18

## 2024-12-16 RX ADMIN — MORPHINE SULFATE 4 MG: 4 INJECTION, SOLUTION INTRAMUSCULAR; INTRAVENOUS at 17:48

## 2024-12-16 RX ADMIN — GLYCOPYRROLATE 0.2 MG: 0.2 INJECTION INTRAMUSCULAR; INTRAVENOUS at 05:13

## 2024-12-16 RX ADMIN — MORPHINE SULFATE 4 MG: 4 INJECTION, SOLUTION INTRAMUSCULAR; INTRAVENOUS at 13:07

## 2024-12-16 RX ADMIN — MORPHINE SULFATE 4 MG: 4 INJECTION, SOLUTION INTRAMUSCULAR; INTRAVENOUS at 08:03

## 2024-12-16 RX ADMIN — MORPHINE SULFATE 4 MG: 4 INJECTION, SOLUTION INTRAMUSCULAR; INTRAVENOUS at 21:05

## 2024-12-16 RX ADMIN — GLYCOPYRROLATE 0.2 MG: 0.2 INJECTION INTRAMUSCULAR; INTRAVENOUS at 21:05

## 2024-12-16 NOTE — CARE COORDINATION
Notes indicate pt may be active w [facility]- call to agency- their records indicate he is active, and that this is a GIP admit. Per [facility] office,  [facility] RN will follow up today.  Continue to follow. Cielo Goodwin RN     6402 Discussed dispo w RN and w family at bedside- their understanding is pt will DC to IPU.  Another call to agency- spoke to liaison- she will check and follow up w CM.  Full CM assessment deferred at this time- completed on IP admission prior to this GIP admit. Cielo Goodwin RN     1503 Call to [facility]OC liaison- states she is en route.  CM following. Cielo Goodwin RN     2717 HOC liaison has met w family- only IPU beds available are [facility] or [place]- family declines and chooses to remain GIP here.  Updated RN and attending.  Cielo Goodwin RN

## 2024-12-16 NOTE — PROGRESS NOTES
V2.0    [facility] Progress Note      Name:  Julio Covington /Age/Sex: 1960  (64 y.o. adult)   MRN & CSN:  [id number] & [id number] Encounter Date/Time: 2024 7:06 AM EST   Location:   PCP: Christina Wright APRN - CNP     Attending:Taj Hicks DO       Hospital Day: 2    Assessment and Recommendations   Julio Covington is a 64 y.o. adult with pmh of hyperlipidemia, CKD, stage IV SCC who presents with <principal problem not specified>    Interval History: No acute overnight events noted.  Patient was seen and examined  this morning.  MRI significant for anoxic brain injury.  Patient was unable to participate in review of systems due to intubation and mental status.  Patient terminally extubated, comfort care measures placed. Patient pending hospice placement.    Plan:   PEA arrest  Elevated troponin  -Unclear etiology, possibly secondary to anaphylaxis to chemotherapy  -Status post ROSC after multiple rounds of ACLS  -Echo showed normal LV systolic function with EF 50 to 55%, regional wall motion abnormality  -Patient currently hemodynamically stable    Acute hypoxic respiratory failure  Sepsis   Bacterial pneumonia  -Present on arrival with clinical respiratory distress with tachypnea, dyspnea, wheezing, use of accessory muscles to breathe  -Blood cultures no growth to date so far  -Continue Unasyn  -Critical care/pulmonology following- ordered for terminal extubation per family wishes    Anoxic brain injury vs status epilepticus  -Neurology consulted  -EEG showed anoxic brain injury but cannot completely exclude nonconvulsive status epilepticus  - Keppra stopped, comfort care measures in place       Stage IV SCC  - comfort measures in place, no longer pursuing treatment per family wishes       Diet No diet orders on file   DVT Prophylaxis [x] Lovenox, []  Heparin, [] SCDs, [] Ambulation,  [] Eliquis, [] Xarelto  [] Coumadin   Code Status Prior   Disposition From: Home  Expected Disposition:

## 2024-12-16 NOTE — PROGRESS NOTES
Pt family declines open hospice beds at [facility] or [place]. Pt remains GIP here. MD notified. Morphine and Robinul given for comfort. Pt on room air with family at bedside.

## 2024-12-17 PROCEDURE — 6360000002 HC RX W HCPCS: Performed by: STUDENT IN AN ORGANIZED HEALTH CARE EDUCATION/TRAINING PROGRAM

## 2024-12-17 PROCEDURE — 1200000000 HC SEMI PRIVATE

## 2024-12-17 RX ORDER — LORAZEPAM 2 MG/ML
1 INJECTION INTRAMUSCULAR
Status: DISCONTINUED | OUTPATIENT
Start: 2024-12-17 | End: 2024-12-18 | Stop reason: HOSPADM

## 2024-12-17 RX ADMIN — MORPHINE SULFATE 2 MG: 2 INJECTION, SOLUTION INTRAMUSCULAR; INTRAVENOUS at 07:52

## 2024-12-17 RX ADMIN — MORPHINE SULFATE 4 MG: 4 INJECTION, SOLUTION INTRAMUSCULAR; INTRAVENOUS at 17:02

## 2024-12-17 RX ADMIN — MORPHINE SULFATE 2 MG: 2 INJECTION, SOLUTION INTRAMUSCULAR; INTRAVENOUS at 22:51

## 2024-12-17 RX ADMIN — LORAZEPAM 1 MG: 2 INJECTION INTRAMUSCULAR; INTRAVENOUS at 12:29

## 2024-12-17 RX ADMIN — GLYCOPYRROLATE 0.2 MG: 0.2 INJECTION INTRAMUSCULAR; INTRAVENOUS at 07:46

## 2024-12-17 RX ADMIN — MORPHINE SULFATE 4 MG: 4 INJECTION, SOLUTION INTRAMUSCULAR; INTRAVENOUS at 10:35

## 2024-12-17 ASSESSMENT — PAIN SCALES - GENERAL: PAINLEVEL_OUTOF10: 6

## 2024-12-17 NOTE — PROGRESS NOTES
[facility]    Chart reviewed and patient assessed.  Breathing labored and with upper airway congestion.  He had robinul and morphine earlier.  Sats in the 40s.    Discussed with nurse who medicated patient with another dose of morphine.  Patient was repositioned after which breathing became easier and congestion cleared.  Sats did get up into 70s.    For now, will not move patient out of [place] since he is too unstable for transport with low O2.  Discussed with nurse and updated FRIDA Paz.

## 2024-12-17 NOTE — PROGRESS NOTES
V2.0    [facility] Progress Note      Name:  Julio Covington /Age/Sex: 1960  (64 y.o. adult)   MRN & CSN:  [id number] & [id number] Encounter Date/Time: 2024 7:06 AM EST   Location:   PCP: Christina Wright APRN - CNP     Attending:Taj Hicks DO       Hospital Day: 3    Assessment and Recommendations   Julio Covington is a 64 y.o. adult with pmh of hyperlipidemia, CKD, stage IV SCC who presents with <principal problem not specified>    Interval History: No acute overnight events noted.  Patient was seen and examined  this morning.  MRI significant for anoxic brain injury.  Patient was unable to participate in review of systems due to intubation and mental status.  Patient terminally extubated, comfort care measures placed. Patient pending hospice placement.    Plan:   PEA arrest  Elevated troponin  -Unclear etiology, possibly secondary to anaphylaxis to chemotherapy  -Status post ROSC after multiple rounds of ACLS  -Echo showed normal LV systolic function with EF 50 to 55%, regional wall motion abnormality  -Patient currently hemodynamically stable    Acute hypoxic respiratory failure  Sepsis   Bacterial pneumonia  -Present on arrival with clinical respiratory distress with tachypnea, dyspnea, wheezing, use of accessory muscles to breathe  -Blood cultures no growth to date so far  -Critical care/pulmonology following- ordered for terminal extubation per family wishes    Anoxic brain injury vs status epilepticus  -Neurology consulted  -EEG showed anoxic brain injury but cannot completely exclude nonconvulsive status epilepticus  - Keppra stopped, comfort care measures in place       Stage IV SCC  - comfort measures in place, no longer pursuing treatment per family wishes       Diet No diet orders on file   DVT Prophylaxis [x] Lovenox, []  Heparin, [] SCDs, [] Ambulation,  [] Eliquis, [] Xarelto  [] Coumadin   Code Status Prior   Disposition From: Home  Expected Disposition: Hospice  in the last 72 hours.  UA:No results found for: \"NITRU\", \"COLORU\", \"PHUR\", \"LABCAST\", \"WBCUA\", \"RBCUA\", \"MUCUS\", \"TRICHOMONAS\", \"YEAST\", \"BACTERIA\", \"CLARITYU\", \"SPECGRAV\", \"LEUKOCYTESUR\", \"UROBILINOGEN\", \"BILIRUBINUR\", \"BLOODU\", \"GLUCOSEU\", \"KETUA\", \"AMORPHOUS\"  Urine Cultures: No results found for: \"LABURIN\"  Blood Cultures:   Lab Results   Component Value Date/Time    BC No Growth after 4 days of incubation. 12/12/2024 04:13 PM     Lab Results   Component Value Date/Time    BLOODCULT2 No Growth after 4 days of incubation. 12/12/2024 04:13 PM     Organism: No results found for: \"ORG\"    Electronically signed by Juancarlos Li MD on 12/17/2024 at 1:02 PM

## 2024-12-17 NOTE — CARE COORDINATION
Chart review- Pt is currently GIP with [facility]. Per [facility], pt may transition to [facility] later this evening.

## 2024-12-18 VITALS
DIASTOLIC BLOOD PRESSURE: 82 MMHG | TEMPERATURE: 99.8 F | HEART RATE: 139 BPM | OXYGEN SATURATION: 90 % | SYSTOLIC BLOOD PRESSURE: 144 MMHG | RESPIRATION RATE: 30 BRPM

## 2024-12-18 PROCEDURE — 6360000002 HC RX W HCPCS: Performed by: STUDENT IN AN ORGANIZED HEALTH CARE EDUCATION/TRAINING PROGRAM

## 2024-12-18 RX ORDER — MORPHINE SULFATE 2 MG/ML
2 INJECTION, SOLUTION INTRAMUSCULAR; INTRAVENOUS
Status: DISCONTINUED | OUTPATIENT
Start: 2024-12-18 | End: 2024-12-18 | Stop reason: HOSPADM

## 2024-12-18 RX ORDER — MORPHINE SULFATE 4 MG/ML
4 INJECTION, SOLUTION INTRAMUSCULAR; INTRAVENOUS
Status: DISCONTINUED | OUTPATIENT
Start: 2024-12-18 | End: 2024-12-18 | Stop reason: HOSPADM

## 2024-12-18 RX ADMIN — MORPHINE SULFATE 2 MG: 2 INJECTION, SOLUTION INTRAMUSCULAR; INTRAVENOUS at 04:57

## 2024-12-18 RX ADMIN — LORAZEPAM 1 MG: 2 INJECTION INTRAMUSCULAR; INTRAVENOUS at 13:16

## 2024-12-18 RX ADMIN — GLYCOPYRROLATE 0.2 MG: 0.2 INJECTION INTRAMUSCULAR; INTRAVENOUS at 10:44

## 2024-12-18 RX ADMIN — LORAZEPAM 1 MG: 2 INJECTION INTRAMUSCULAR; INTRAVENOUS at 03:22

## 2024-12-18 RX ADMIN — GLYCOPYRROLATE 0.2 MG: 0.2 INJECTION INTRAMUSCULAR; INTRAVENOUS at 04:59

## 2024-12-18 RX ADMIN — MORPHINE SULFATE 4 MG: 4 INJECTION, SOLUTION INTRAMUSCULAR; INTRAVENOUS at 13:16

## 2024-12-18 RX ADMIN — LORAZEPAM 1 MG: 2 INJECTION INTRAMUSCULAR; INTRAVENOUS at 10:44

## 2024-12-18 RX ADMIN — MORPHINE SULFATE 4 MG: 4 INJECTION, SOLUTION INTRAMUSCULAR; INTRAVENOUS at 15:33

## 2024-12-18 RX ADMIN — MORPHINE SULFATE 4 MG: 4 INJECTION, SOLUTION INTRAMUSCULAR; INTRAVENOUS at 10:45

## 2024-12-18 NOTE — PLAN OF CARE
Problem: Discharge Planning  Goal: Discharge to home or other facility with appropriate resources  12/18/2024 0058 by Garima Chaidez, RN  Outcome: Progressing  Flowsheets (Taken 12/17/2024 2000)  Discharge to home or other facility with appropriate resources: Refer to discharge planning if patient needs post-hospital services based on physician order or complex needs related to functional status, cognitive ability or social support system     Problem: Safety - Adult  Goal: Free from fall injury  12/18/2024 0058 by Garima Chaidez, RN  Outcome: Progressing  Flowsheets (Taken 12/16/2024 0857 by Mattie Blanchard, RN)  Free From Fall Injury:   Instruct family/caregiver on patient safety   Based on caregiver fall risk screen, instruct family/caregiver to ask for assistance with transferring infant if caregiver noted to have fall risk factors     Problem: Pain  Goal: Verbalizes/displays adequate comfort level or baseline comfort level  12/18/2024 0058 by Garima Chaidez RN  Outcome: Progressing  Flowsheets (Taken 12/17/2024 2251)  Verbalizes/displays adequate comfort level or baseline comfort level:   Assess pain using appropriate pain scale   Administer analgesics based on type and severity of pain and evaluate response   Implement non-pharmacological measures as appropriate and evaluate response

## 2024-12-18 NOTE — PROGRESS NOTES
[facility]    Chart review and patient assessment. Patient with labored respirations and audible congestion. Collaborating with Elisha GUNDERSON, will medicate at this time.Son at bedside. Discussed patient condition, and plan to transport to [facility]. [facility] scheduled 1400 today. Will reevaluate closer to that time if patient appropriate to move. FRIDA Paz updated.     Xenia Bryant RN  [phone] [place]  [phone] Office.    HB=252, RR=44 with extremely labored respirations, sternal retractions. Discussion with son Jose Juan at bedside. Not stable to transport, [facility] cancelled. Lily GUNDERSON and FRIDA Paz updated per .

## 2024-12-18 NOTE — CARE COORDINATION
FRIDA spoke with Xenia at [facility]. She will look into pt transferring to [facility] today.     11:40 AM  FRIDA spoke with Xenia at [facility]. Plan for transfer at 2pm today to [facility].

## 2024-12-18 NOTE — DISCHARGE INSTR - COC
Continuity of Care Form    Patient Name: Julio Covington   :  1960  MRN:  [id number]    Admit date:  12/15/2024  Discharge date:  ***    Code Status Order: Prior   Advance Directives:   Advance Care Flowsheet Documentation             Admitting Physician:  Tay Melara MD  PCP: Christina Wright APRN - CNP    Discharging Nurse: ***  Discharging Hospital Unit/Room#: 0232/0232-01  Discharging Unit Phone Number: ***    Emergency Contact:   Extended Emergency Contact Information  Primary Emergency Contact: Larissa Covington  Home Phone: [phone]  Mobile Phone: [phone]  Relation: Parent  Secondary Emergency Contact: Jose Juan Covington  Home Phone: [phone]  Relation: Child    Past Surgical History:  No past surgical history on file.    Immunization History:     There is no immunization history on file for this patient.    Active Problems:  Patient Active Problem List   Diagnosis Code    Cardiac arrest I46.9    Elevated troponin R79.89    Right bundle branch block I45.10    Acute encephalopathy G93.40       Isolation/Infection:   Isolation            No Isolation          Patient Infection Status       None to display            Nurse Assessment:  Last Vital Signs: BP (!) 144/82   Pulse (!) 139   Temp 99.8 °F (37.7 °C) (Axillary)   Resp 30   SpO2 90%     Last documented pain score (0-10 scale): Pain Level: 6  Last Weight:   Wt Readings from Last 1 Encounters:   12/15/24 101.8 kg (224 lb 6.9 oz)     Mental Status:  {IP PT MENTAL STATUS:64229}    IV Access:  { CHRIS IV ACCESS:503599769}    Nursing Mobility/ADLs:  Walking   {CHP DME ADLs:915658576}  Transfer  {CHP DME ADLs:447994425}  Bathing  {CHP DME ADLs:783749281}  Dressing  {CHP DME ADLs:879814622}  Toileting  {CHP DME ADLs:912099466}  Feeding  {CHP DME ADLs:885261642}  Med Admin  {CHP DME ADLs:075303914}  Med Delivery   { CHRIS MED Delivery:994468441}    Wound Care Documentation and Therapy:        Elimination:  Continence:   Bowel: {YES /

## 2024-12-19 NOTE — DISCHARGE SUMMARY
Strep Pneumonia.  No antibiotics at this time.     Sepsis - w/ Leukocytosis/Tachycardia/Fever/Elevated Lactate POArrival 2nd to above infection.  Continue IVF as appropriate and monitor clinical response w/ ABX as ordered.      Consults:     IP CONSULT TO HOSPITALIST  IP CONSULT TO CRITICAL CARE  IP CONSULT TO DIETITIAN  IP CONSULT TO NEUROLOGY  IP CONSULT TO HOSPICE    Signed:    Taj Hicks DO

## 2024-12-19 NOTE — DEATH NOTES
Death Pronouncement Note  Patient's Name: Julio Covington   Patient's YOB: 1960  MRN Number: [id number]    Admitting Provider: Joao Larsen DO  Attending Provider: No att. providers found    Patient was examined and the following were absent: Pulses, Blood Pressure, and Respiratory effort    I declared the patient dead on 12/18/24 at 15:56    Preliminary Cause of Death:   Respiratory arrest 2/2 Bacterial Pneumonia  Electronically signed by Tja Hicks DO on 12/18/24 at 9:46 PM EST

## 2024-12-19 NOTE — DISCHARGE SUMMARY
V2.0  Discharge Summary    Name:  Julio Covington /Age/Sex: 1960 (64 y.o. male)   Admit Date: 12/15/2024  Discharge Date: 24    MRN & CSN:  [id number] & [id number] Encounter Date and Time 24 2:36 PM EST    Attending:  No att. providers found Discharging Provider: Juancarlos Li MD       Hospital Course:   Patient: Julio Covington   : 1960             Primary Care Provider: Christina Wright, APRN - CNP  Admitting Provider: Joao Larsen DO  Discharge Provider: Taj Hicks DO      Admit Date: 2024   Disposition:       Date of Death: 24  Time of Death: 1556     Immediate Cause of Death: Respiratory Arrest   Underlying Conditions: Bacterial Pneumonia   Other Contributing Conditions: PEA arrest / anoxic brain injury / sepsis     Discharge Diagnoses:          Active Hospital Problems     Diagnosis      Acute encephalopathy [G93.40]      Elevated troponin [R79.89]      Right bundle branch block [I45.10]      Cardiac arrest [I46.9]           Presenting Admission History:   Julio Covington is a 64 y.o. adult with past medical history of hyperlipidemia, CKD, stage IV SCC who presented with unresponsiveness.  Patient was receiving chemotherapy outpatient when he became unresponsive.  When EMS arrived, patient was hypoxic, cyanotic, and found to be in PEA arrest.  He was given epinephrine and CPR and in the ED he received 4-5 rounds of ACLS, eventually achieving ROSC.  There was concern that patient had anaphylaxis due to a developing rash but this is unclear.  Patient was found to have lactic acidosis and a slightly elevated ALT and AST as well as elevated creatinine.  CT PE was negative for PE.  Patient was admitted for further management cardiac arrest.          Assessment/Plan:  Comfort care with hospice  -Comfort care medications ordered  -Titrate medications to patient's comfort     PEA arrest  -Etiology unclear at this time  -Status post ROSC  -Currently  hemodynamically stable     Acute Respiratory Failure - w/ hypoxia, POArrival.  Presence of clinical respiratory distress w/ tachypnea/dyspnea/SOB and wheezing w/ use of accessory muscles to breath.  On supplemental O2 as ordered and wean as tolerated.      PNA - likely Gram Positive Community Acquired Pneumonia, possibly due to Strep Pneumonia.  No antibiotics at this time.     Sepsis - w/ Leukocytosis/Tachycardia/Fever/Elevated Lactate POArrival 2nd to above infection.  Continue IVF as appropriate and monitor clinical response w/ ABX as ordered.       Consults:      IP CONSULT TO HOSPITALIST  IP CONSULT TO CRITICAL CARE  IP CONSULT TO DIETITIAN  IP CONSULT TO NEUROLOGY  IP CONSULT TO HOSPICE

## 2024-12-23 NOTE — H&P
Brief H&P    Patient was discharged and readmitted under Hospice care. Please see progress note for details.    1.  PEA arrest with elevated troponin.  Possible secondary to anaphylaxis.  Possible anoxic brain injury.  Continue supportive care.  Critical care team following. Discussed with Dr. Mendez.   2.  Acute hypoxic respiratory failure.  Patient weaned off sedation but unresponsive.  Patient with evidence of pneumonia.  Continue Unasyn.  Pulmonary critical care following.  Discussed plan with Dr. Mendez.  3.  Stage IV SCC.  Patient was following with [facility].  Patient on Lovenox.  4.  Lactic acidosis secondary to cardiac arrest.  Will continue to trend. Recheck to rule out seizure.   5.  BRE.  This appears to have improved.  Will monitor for worsening renal function.  Patient may need nephrology consult if this worsens.  Creatinine 0.8  6.  Possible anoxic brain injury.  EEG = continuous generalized periodic discharge.  Seen by neurology.  MRI shows diffuse anoxic brain injury.  Discussed poor prognosis with family.  They state he will have some more visitors and then the plan will be terminal wean.  Patient will be given comfort meds prior to extubation.    Patient was extubated. He was discharged and readmitted under hospice care.    GREGORY HOLDEN MD  12/23/2024  12:10 PM

## 2025-06-10 NOTE — CARE COORDINATION
Case Management Assessment  Initial Evaluation    Date/Time of Evaluation: 12/13/2024 9:53 AM  Assessment Completed by: Ramona Metcalf RN    If patient is discharged prior to next notation, then this note serves as note for discharge by case management.    Patient Name: Julio Covington                   YOB: 1960  Diagnosis: Cardiac arrest [I46.9]  Anaphylaxis, initial encounter [T78.2XXA]                   Date / Time: 12/12/2024  1:05 PM    Patient Admission Status: Inpatient   Readmission Risk (Low < 19, Mod (19-27), High > 27): Readmission Risk Score: 14.3    Current PCP: Christina Wright APRN - CNP  PCP verified by CM? Yes    Chart Reviewed: Yes      History Provided by: Child/Family (mother, Larissa and son, Jose Juan)  Patient Orientation: Unable to Assess (on vent, off sedation, writer did not attempt to talk to pt)    Patient Cognition: Other (see comment) (not assessed)    Hospitalization in the last 30 days (Readmission):  No    If yes, Readmission Assessment in CM Navigator will be completed.    Advance Directives:    Code Status: Full Code   Patient's Primary Decision Maker is: Legal Next of Kin    Primary Decision Maker: Jose Juan Covington - Child    Supplemental (Other) Decision Maker: AdriaLarissa - Parent - [phone]    Discharge Planning:  Patient lives with: Parent Type of Home: House  Primary Care Giver: Self  Patient Support Systems include: Family Members, Children, Parent   Current Financial resources: Medicare  Current community resources: None  Current services prior to admission: None (outpatient chemo)            Type of Home Care services:  None    ADLS  Prior functional level: Other (see comment) (fairly independent, was not alone, mother went with him to appointments)  Current functional level: Other (see comment) (vent, bedrest)    Family can provide assistance at DC: Yes  Would you like Case Management to discuss the discharge plan with any other family members/significant others,  Other